# Patient Record
Sex: FEMALE | Race: WHITE | NOT HISPANIC OR LATINO | Employment: OTHER | URBAN - METROPOLITAN AREA
[De-identification: names, ages, dates, MRNs, and addresses within clinical notes are randomized per-mention and may not be internally consistent; named-entity substitution may affect disease eponyms.]

---

## 2018-06-14 ENCOUNTER — OFFICE VISIT (OUTPATIENT)
Dept: NEUROSURGERY | Facility: CLINIC | Age: 68
End: 2018-06-14

## 2018-06-14 VITALS — WEIGHT: 169 LBS | TEMPERATURE: 98 F | HEIGHT: 63 IN | BODY MASS INDEX: 29.95 KG/M2

## 2018-06-14 DIAGNOSIS — M48.062 SPINAL STENOSIS OF LUMBAR REGION WITH NEUROGENIC CLAUDICATION: Primary | ICD-10-CM

## 2018-06-14 DIAGNOSIS — M47.816 FACET ARTHROPATHY, LUMBAR: ICD-10-CM

## 2018-06-14 PROCEDURE — 99203 OFFICE O/P NEW LOW 30 MIN: CPT | Performed by: NEUROLOGICAL SURGERY

## 2018-06-14 RX ORDER — AMMONIUM LACTATE 12 G/100G
LOTION TOPICAL AS NEEDED
COMMUNITY
Start: 2018-05-25

## 2018-06-14 RX ORDER — SULFACETAMIDE SODIUM 100 MG/ML
LOTION TOPICAL
COMMUNITY
Start: 2018-05-25

## 2018-06-14 RX ORDER — CELECOXIB 200 MG/1
CAPSULE ORAL
COMMUNITY
Start: 2018-03-28

## 2018-06-14 RX ORDER — LEVOTHYROXINE SODIUM 75 MCG
TABLET ORAL
COMMUNITY
Start: 2018-03-28

## 2018-06-14 RX ORDER — KETOCONAZOLE 20 MG/ML
SHAMPOO TOPICAL
COMMUNITY
Start: 2018-06-01

## 2018-06-14 NOTE — PATIENT INSTRUCTIONS
Spinal Stenosis  Spinal stenosis occurs when the open space (spinal canal) between the bones of your spine (vertebrae) narrows, putting pressure on the spinal cord or nerves.  What are the causes?  This condition is caused by areas of bone pushing into the central canals of your vertebrae. This condition may be present at birth (congenital), or it may be caused by:  · Arthritic deterioration of your vertebrae (spinal degeneration). This usually starts around age 50.  · Injury or trauma to the spine.  · Tumors in the spine.  · Calcium deposits in the spine.  What are the signs or symptoms?  Symptoms of this condition include:  · Pain in the neck or back that is generally worse with activities, particularly when standing and walking.  · Numbness, tingling, hot or cold sensations, weakness, or weariness in your legs.  · Pain going up and down the leg (sciatica).  · Frequent episodes of falling.  · A foot-slapping gait that leads to muscle weakness.  In more serious cases, you may develop:  · Problems passing stool or passing urine.  · Difficulty having sex.  · Loss of feeling in part or all of your leg.  Symptoms may come on slowly and get worse over time.  How is this diagnosed?  This condition is diagnosed based on your medical history and a physical exam. Tests will also be done, such as:  · MRI.  · CT scan.  · X-ray.  How is this treated?  Treatment for this condition often focuses on managing your pain and any other symptoms. Treatment may include:  · Practicing good posture to lessen pressure on your nerves.  · Exercising to strengthen muscles, build endurance, improve balance, and maintain good joint movement (range of motion).  · Losing weight, if needed.  · Taking medicines to reduce swelling, inflammation, or pain.  · Assistive devices, such as a corset or brace.  In some cases, surgery may be needed. The most common procedure is decompression laminectomy. This is done to remove excess bone that puts pressure  on your nerve roots.  Follow these instructions at home:  Managing pain, stiffness, and swelling   · Do all exercises and stretches as told by your health care provider.  · Practice good posture. If you were given a brace or a corset, wear it as told by your health care provider.  · Do not do any activities that cause pain. Ask your health care provider what activities are safe for you.  · Do not lift anything that is heavier than 10 lb (4.5 kg) or the limit that your health care provider tells you.  · Maintain a healthy weight. Talk with your health care provider if you need help losing weight.  · If directed, apply heat to the affected area as often as told by your health care provider. Use the heat source that your health care provider recommends, such as a moist heat pack or a heating pad.  ¨ Place a towel between your skin and the heat source.  ¨ Leave the heat on for 20-30 minutes.  ¨ Remove the heat if your skin turns bright red. This is especially important if you are not able to feel pain, heat, or cold. You may have a greater risk of getting burned.  General instructions   · Take over-the-counter and prescription medicines only as told by your health care provider.  · Do not use any products that contain nicotine or tobacco, such as cigarettes and e-cigarettes. If you need help quitting, ask your health care provider.  · Eat a healthy diet. This includes plenty of fruits and vegetables, whole grains, and low-fat (lean) protein.  · Keep all follow-up visits as told by your health care provider. This is important.  Contact a health care provider if:  · Your symptoms do not get better or they get worse.  · You have a fever.  Get help right away if:  · You have new or worse pain in your neck or upper back.  · You have severe pain that cannot be controlled with medicines.  · You are dizzy.  · You have vision problems, blurred vision, or double vision.  · You have a severe headache that is worse when you  stand.  · You have nausea or you vomit.  · You develop new or worse numbness or tingling in your back or legs.  · You have pain, redness, swelling, or warmth in your arm or leg.  Summary  · Spinal stenosis occurs when the open space (spinal canal) between the bones of your spine (vertebrae) narrows. This narrowing puts pressure on the spinal cord or nerves.  · Spinal stenosis can cause numbness, weakness, or pain in the neck, back, and legs.  · This condition may be caused by a birth defect, arthritic deterioration of your vertebrae, injury, tumors, or calcium deposits.  · This condition is usually diagnosed with MRIs, CT scans, and X-rays.  This information is not intended to replace advice given to you by your health care provider. Make sure you discuss any questions you have with your health care provider.  Document Released: 03/09/2005 Document Revised: 11/22/2017 Document Reviewed: 11/22/2017  [employer] Interactive Patient Education © 2017 [employer]    Laminectomy  Laminectomy is a surgery to remove:  · Small pieces of bone in the spine (lamina).  · Tough, cord-like tissues that connect bones to other bones (ligaments) underneath the lamina. These ligaments connect the bones in the spine (vertebrae).  · Parts of joints in the spine (facet joints) that have grown too large.  The goals of this surgery are:  · To reduce pressure on nerves and the spinal cord.  · To reduce pain, numbness, and discomfort.  You may need this procedure if you have narrowing of the spinal canal (spinal stenosis) or if you have a spinal tumor, spinal injury, or Paget disease of bone.  Tell a health care provider about:  · Any allergies you have.  · All medicines you are taking, including vitamins, herbs, eye drops, creams, and over-the-counter medicines.  · Any problems you or family members have had with anesthetic medicines.  · Any blood disorders you have.  · Any surgeries you have had.  · Any medical conditions you have,  including a cold or any infections.  · Whether you are pregnant or may be pregnant.  What are the risks?  Generally, this is a safe procedure. However, problems may occur, including:  · Infection.  · Bleeding.  · Allergic reactions to medicines or dyes.  · Damage to other structures or organs, such as nerves. Nerve damage can cause pain, weakness, or numbness.  · Leaking of spinal fluid.  · A blood clot in a leg. The clot can move to the lungs, which can be very serious.  · Inability to control when you urinate (urinary incontinence) or when you have bowel movements (fecal incontinence). This is rare.  What happens before the procedure?  Staying hydrated   Follow instructions from your health care provider about hydration, which may include:  · Up to 2 hours before the procedure - you may continue to drink clear liquids, such as water, clear fruit juice, black coffee, and plain tea.  Eating and drinking restrictions   Follow instructions from your health care provider about eating and drinking, which may include:  · 24 hours before the procedure - stop drinking alcohol.  · 8 hours before the procedure - stop eating heavy meals or foods such as meat, fried foods, or fatty foods.  · 6 hours before the procedure - stop eating light meals or foods, such as toast or cereal.  · 6 hours before the procedure - stop drinking milk or drinks that contain milk.  · 2 hours before the procedure - stop drinking clear liquids.  Medicines   · Ask your health care provider about:  ¨ Changing or stopping your regular medicines. This is especially important if you are taking diabetes medicines or blood thinners. If your health care provider asks you to keep taking some medicines, take them with a sip of water.  ¨ Taking medicines such as aspirin and ibuprofen. These medicines can thin your blood. Do not take these medicines before your procedure if your health care provider instructs you not to.  · You may be given antibiotic medicine  to help prevent infection.  General instructions   · Do not use any products that contain nicotine or tobacco, such as cigarettes and e-cigarettes, for at least 2 weeks before the procedure. If you need help quitting, ask your health care provider.  · Ask your health care provider how your surgical site will be marked or identified.  · You may have tests done, such as blood tests or an electrocardiogram (ECG).  · If you will be going home right after the procedure, plan to have someone with you for 24 hours.  · Plan to have someone:  ¨ Take you home from the hospital or clinic.  ¨ Help you at home for the first week after the procedure.  What happens during the procedure?  · To reduce your risk of infection, your health care team will wash or sanitize their hands.  · Small monitors will be placed on your body to check your heart rate, blood pressure, and oxygen level.  · An IV tube will be inserted into one of your veins.  · You will be given a medicine to make you fall asleep (general anesthetic). You may also be given a medicine to help you relax (sedative).  · A breathing tube will be placed into your lungs.  · Your back will be cleaned with a germ-killing solution.  · An incision will be made in your back. The incision may be 2-5 inches (5-13 cm) long, depending on how many vertebrae are being operated on.  · Muscles in your back will be moved away from the vertebrae and pulled to the side.  · Pieces of lamina will be removed.  · Ligaments and thickened facet joints will be removed. How much tissue and bone is removed depends on how much of the tissue is putting pressure on your nerves.  · Your nerves will be identified and evaluated to check for excessive tightness.  · Your back muscles will be moved back into their normal position.  · The area under your skin will be closed with small, dissolvable stitches (sutures).  · Your skin will be closed with small, absorbable sutures or staples.  · A bandage (dressing)  will be placed over your incision.  The procedure may vary among health care providers and hospitals.  What happens after the procedure?  · Your blood pressure, heart rate, breathing rate, and blood oxygen level will be monitored until the medicines you were given have worn off.  · You may continue receive medicines and fluids through an IV tube.  · You will have some back pain. You will be given pain medicine as needed.  · It is important to be up and moving as soon as possible after this procedure. Physical therapists will help you start walking.  · To prevent blood clots in your legs:  ¨ You may have to wear compression stockings. These stockings also reduce swelling in your legs.  ¨ You may need to take medicines.  · You may need to do breathing exercises to help prevent lung infection.  · Do not drive for 24 hours if you received a sedative.  Summary  · Laminectomy is a procedure that is done to reduce pressure on nerves and the spinal cord and to reduce pain, numbness, and discomfort.  · If you will be going home right after the procedure, plan to have someone with you for 24 hours.  · You will have some back pain. You will be given pain medicine as needed.  · It is important to be up and moving as soon as possible after this procedure. Physical therapists will help you start walking.  This information is not intended to replace advice given to you by your health care provider. Make sure you discuss any questions you have with your health care provider.  Document Released: 12/06/2010 Document Revised: 08/03/2017 Document Reviewed: 06/04/2017  [employer] Interactive Patient Education © 2017 [employer]    Laminectomy, Care After  This sheet gives you information about how to care for yourself after your procedure. Your health care provider may also give you more specific instructions. If you have problems or questions, contact your health care provider.  What can I expect after the procedure?  After the  procedure, it is common to have:  · Some pain around your incision area.  · Muscle tightening (spasms) across the back.  Follow these instructions at home:  Incision care   · Follow instructions from your health care provider about how to take care of your incision area. Make sure you:  ¨ Wash your hands with soap and water before and after you apply medicine to the area or change your bandage (dressing). If soap and water are not available, use hand .  ¨ Change your dressing as told by your health care provider.  ¨ Leave stitches (sutures), skin glue, or adhesive strips in place. These skin closures may need to stay in place for 2 weeks or longer. If adhesive strip edges start to loosen and curl up, you may trim the loose edges. Do not remove adhesive strips completely unless your health care provider tells you to do that.  · Check your incision area every day for signs of infection. Check for:  ¨ More redness, swelling, or pain.  ¨ More fluid or blood.  ¨ Warmth.  ¨ Pus or a bad smell.  Medicines   · Take over-the-counter and prescription medicines only as told by your health care provider.  · If you were prescribed an antibiotic medicine, use it as told by your health care provider. Do not stop using the antibiotic even if you start to feel better.  Bathing   · Do not take baths, swim, or use a hot tub for 2 weeks, or until your incision has healed completely.  · If your health care provider approves, you may take showers after your dressing has been removed.  Activity   · Return to your normal activities as told by your health care provider. Ask your health care provider what activities are safe for you.  · Avoid bending or twisting at your waist. Always bend at your knees.  · Do not sit for more than 20-30 minutes at a time. Lie down or walk between periods of sitting.  · Do not lift anything that is heavier than 10 lb (4.5 kg) or the limit that your health care provider tells you, until he or she says  that it is safe.  · Do not drive for 2 weeks after your procedure or for as long as your health care provider tells you.  · Do not drive or use heavy machinery while taking prescription pain medicine.  General instructions   · To prevent or treat constipation while you are taking prescription pain medicine, your health care provider may recommend that you:  ¨ Drink enough fluid to keep your urine clear or pale yellow.  ¨ Take over-the-counter or prescription medicines.  ¨ Eat foods that are high in fiber, such as fresh fruits and vegetables, whole grains, and beans.  ¨ Limit foods that are high in fat and processed sugars, such as fried and sweet foods.  · Do breathing exercises as told.  · Keep all follow-up visits as told by your health care provider. This is important.  Contact a health care provider if:  · You have more redness, swelling, or pain around your incision area.  · Your incision feels warm to the touch.  · You are not able to return to activities or do exercises as told by your health care provider.  Get help right away if:  · You have:  ¨ More fluid or blood coming from your incision area.  ¨ Pus or a bad smell coming from your incision area.  ¨ Chills or a fever.  ¨ Episodes of dizziness or fainting while standing.  · You develop a rash.  · You develop shortness of breath or you have difficulty breathing.  · You cannot control when you urinate or have a bowel movement.  · You become weak.  · You are not able to use your legs.  Summary  · After the procedure, it is common to have some pain around your incision area. You may also have muscle tightening (spasms) across the back.  · Follow instructions from your health care provider about how to care for your incision.  · Do not lift anything that is heavier than 10 lb (4.5 kg) or the limit that your health care provider tells you, until he or she says that it is safe.  · Contact your health care provider if you have more redness, swelling, or pain  around your incision area or if your incision feels warm to the touch. These can be signs of infection.  This information is not intended to replace advice given to you by your health care provider. Make sure you discuss any questions you have with your health care provider.  Document Released: 07/07/2006 Document Revised: 08/03/2017 Document Reviewed: 06/04/2017  [employer] Interactive Patient Education © 2017 [employer]      If you have any questions in regards to your visit with  today, please do not hesitate to contact our office. Ask to speak with a CMA for any clinical questions you may have.    Ledy Yuan, [facility]    [phone]

## 2018-06-14 NOTE — PROGRESS NOTES
Subjective   Patient ID: Shelly Stevens is a 67 y.o. female is being seen for consultation today at the request of Josep Atkinson MD  Chief Complaint: Lower back and leg pain    History of Present Illness: The patient is a 67-year-old woman from [place] who has a complaint of pain in her lower back this been present for years and is slowly worsening as time goes on, particularly in the past year.  She has pain in her back at the lumbosacral region that radiates to her hips and into her lateral thighs which occurs with lifting, sitting too long, riding long distance in the car.  Sometimes she wakes up in pain.  When she is gardening she feels better she's bent forward.  Ice tends to help her more than heat.  She tried a brief course of physical therapy.  She has had chiropractic therapy off and on for years with satisfactory help.  She also experiences some neck pain.  She had a prior consultation with Dr. Sigala in [place] and is here for another opinion.    Review of Radiographic Studies:   Lumbar MRI scan shows moderate stenosis at L3-4, degenerative disc at L4-5.  No spondylolisthesis.    The following portions of the patient's history were reviewed, updated as appropriate and approved: allergies, current medications, past family history, past medical history, past social history, past surgical history, review of systems and problem list.    Review of Systems   Constitutional: Negative for activity change, appetite change, chills, diaphoresis, fatigue, fever and unexpected weight change.   HENT: Negative for congestion, dental problem, drooling, ear discharge, ear pain, facial swelling, hearing loss, mouth sores, nosebleeds, postnasal drip, rhinorrhea, sinus pressure, sneezing, sore throat, tinnitus, trouble swallowing and voice change.    Eyes: Negative for photophobia, pain, discharge, redness, itching and visual disturbance.   Respiratory: Negative for apnea, cough, choking, chest tightness,  shortness of breath, wheezing and stridor.    Cardiovascular: Negative for chest pain, palpitations and leg swelling.   Gastrointestinal: Negative for abdominal distention, abdominal pain, anal bleeding, blood in stool, constipation, diarrhea, nausea, rectal pain and vomiting.   Endocrine: Negative for cold intolerance, heat intolerance, polydipsia, polyphagia and polyuria.   Genitourinary: Negative for decreased urine volume, difficulty urinating, dysuria, enuresis, flank pain, frequency, genital sores, hematuria and urgency.   Musculoskeletal: Positive for myalgias, neck pain and neck stiffness. Negative for arthralgias, back pain, gait problem and joint swelling.   Skin: Negative for color change, pallor, rash and wound.   Allergic/Immunologic: Positive for environmental allergies. Negative for food allergies and immunocompromised state.   Neurological: Positive for weakness and light-headedness. Negative for dizziness, tremors, seizures, syncope, facial asymmetry, speech difficulty, numbness and headaches.   Hematological: Negative for adenopathy. Does not bruise/bleed easily.   Psychiatric/Behavioral: Positive for decreased concentration. Negative for agitation, behavioral problems, confusion, dysphoric mood, hallucinations, self-injury, sleep disturbance and suicidal ideas. The patient is nervous/anxious. The patient is not hyperactive.    All other systems reviewed and are negative.      Objective     NEUROLOGICAL EXAMINATION:      MENTAL STATUS:  Alert and oriented.  Speech intact.  Recent and remote memory intact.      CRANIAL NERVES:  Cranial nerve II:  Visual fields are full.  Cranial nerves III, IV and VI:  PERRLADC.  Extraocular movements are intact.  Nystagmus is not present.  Cranial nerve V:  Facial sensation is intact.  Cranial nerve VII:  Muscles of facial expression reveal no asymmetry.  Cranial nerve VIII:  Hearing is intact.  Cranial nerves IX and X:  Palate elevates symmetrically.  Cranial  nerve XI:  Shoulder shrug is intact.  Cranial nerve XII:  Tongue is midline without evidence of atrophy or fasciculation.    MUSCULOSKELETAL:  SLR negative. Tavo's test negative.    MOTOR:  Intact strength in knee extension, ankle dorsiflexion, and plantar flexion bilaterally.    SENSATION:  No sensory loss noted in either lower extremity.    REFLEXES:  DTR 2+ both knees and both ankles.      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  Degenerative facet and disc disease L3-4 and L4-5 with moderate back pain syndrome.       Plan   Surgery for the stenosis at this point doesn't seem to be necessary as I believe the symptoms can be controlled adequately with reasonable medication, exercise, and avoiding aggravating maneuvers with the back.  If symptoms of neurogenic claudication continued to progress however she would be a satisfactory candidate for a decompression of the stenosis at L3-4.       Josep Beltran MD

## 2018-06-15 ENCOUNTER — TELEPHONE (OUTPATIENT)
Dept: NEUROSURGERY | Facility: CLINIC | Age: 68
End: 2018-06-15

## 2018-06-15 DIAGNOSIS — M48.062 SPINAL STENOSIS OF LUMBAR REGION WITH NEUROGENIC CLAUDICATION: Primary | ICD-10-CM

## 2018-06-15 RX ORDER — CYCLOBENZAPRINE HCL 10 MG
10 TABLET ORAL NIGHTLY
Qty: 30 TABLET | Refills: 0 | Status: SHIPPED | OUTPATIENT
Start: 2018-06-15

## 2018-06-15 NOTE — TELEPHONE ENCOUNTER
Provider: Devin   Caller: Shelly  Time of call:   1115d  Phone #:  [phone]  Last visit:  Yesterday 6/15/18   Next visit: PRN        Reason for call:       Pt states she's been taking Tylenol and Advil for pain but it messes with her stomach. Pt wants to know if  will prescribe something other than a NSAID for pt to take mostly at night time since she wakes up through out the night with pain. ]    Please advise.

## 2019-09-12 ENCOUNTER — APPOINTMENT (OUTPATIENT)
Dept: WOMENS IMAGING | Facility: HOSPITAL | Age: 69
End: 2019-09-12

## 2019-09-12 PROCEDURE — 77063 BREAST TOMOSYNTHESIS BI: CPT | Performed by: RADIOLOGY

## 2019-09-12 PROCEDURE — 77067 SCR MAMMO BI INCL CAD: CPT | Performed by: RADIOLOGY

## 2020-08-19 ENCOUNTER — APPOINTMENT (OUTPATIENT)
Dept: WOMENS IMAGING | Facility: HOSPITAL | Age: 70
End: 2020-08-19

## 2020-08-19 PROCEDURE — 77062 BREAST TOMOSYNTHESIS BI: CPT | Performed by: RADIOLOGY

## 2020-08-19 PROCEDURE — 76641 ULTRASOUND BREAST COMPLETE: CPT | Performed by: RADIOLOGY

## 2020-08-19 PROCEDURE — 77066 DX MAMMO INCL CAD BI: CPT | Performed by: RADIOLOGY

## 2021-02-23 ENCOUNTER — CONSULT (OUTPATIENT)
Dept: CARDIOLOGY | Facility: CLINIC | Age: 71
End: 2021-02-23

## 2021-02-23 VITALS
HEIGHT: 63 IN | WEIGHT: 172 LBS | HEART RATE: 73 BPM | OXYGEN SATURATION: 94 % | SYSTOLIC BLOOD PRESSURE: 120 MMHG | BODY MASS INDEX: 30.48 KG/M2 | DIASTOLIC BLOOD PRESSURE: 74 MMHG

## 2021-02-23 DIAGNOSIS — R55 SYNCOPE AND COLLAPSE: Primary | ICD-10-CM

## 2021-02-23 PROCEDURE — 99204 OFFICE O/P NEW MOD 45 MIN: CPT | Performed by: PHYSICIAN ASSISTANT

## 2021-02-23 PROCEDURE — 93000 ELECTROCARDIOGRAM COMPLETE: CPT | Performed by: PHYSICIAN ASSISTANT

## 2021-02-23 RX ORDER — OLOPATADINE HYDROCHLORIDE 1 MG/ML
1 SOLUTION/ DROPS OPHTHALMIC 2 TIMES DAILY PRN
COMMUNITY

## 2021-02-23 RX ORDER — PANTOPRAZOLE SODIUM 40 MG/1
TABLET, DELAYED RELEASE ORAL AS NEEDED
COMMUNITY

## 2021-02-23 RX ORDER — CHOLECALCIFEROL (VITAMIN D3) 125 MCG
5 CAPSULE ORAL
COMMUNITY

## 2021-02-23 RX ORDER — CLONAZEPAM 0.5 MG/1
0.5 TABLET ORAL NIGHTLY PRN
COMMUNITY
Start: 2021-02-08

## 2021-02-23 RX ORDER — MULTIPLE VITAMINS W/ MINERALS TAB 9MG-400MCG
1 TAB ORAL DAILY
COMMUNITY

## 2021-02-23 RX ORDER — CHLORAL HYDRATE 500 MG
CAPSULE ORAL
COMMUNITY

## 2021-02-23 RX ORDER — MONTELUKAST SODIUM 10 MG/1
10 TABLET ORAL NIGHTLY
COMMUNITY

## 2021-02-23 RX ORDER — ALBUTEROL SULFATE 90 UG/1
POWDER, METERED RESPIRATORY (INHALATION)
COMMUNITY

## 2021-02-23 RX ORDER — LEVOCETIRIZINE DIHYDROCHLORIDE 5 MG/1
5 TABLET, FILM COATED ORAL AS NEEDED
COMMUNITY
Start: 2021-01-01

## 2021-02-23 NOTE — PROGRESS NOTES
"[facility] at [facility]  INITIAL OFFICE CONSULT      Shelly Stevens  1950  PCP: Josep Atkinson MD    SUBJECTIVE:   Shelly Stevens is a 70 y.o. female seen for a consultation visit regarding the following:     Chief Complaint:   Chief Complaint   Patient presents with   • Syncope     consult          Consultation is requested by Josep Atkinson MD for evaluation of Syncope (consult)        History:  Pleasant 70-year-old female presents today for consultation requested primary care provider regarding palpitations and near syncope events.  The patient had a work-up with local cardiologist 2 years ago for the same symptoms including a negative stress test normal echocardiogram and -24-hour monitor and carotid scan.  The patient ports continues to have issues her main concern is that she has some abdominal \"spasm\" which may be due to her bladder spasms.  This sometimes can trigger episodes where she feels nausea weakness diaphoretic she feels that some lightheadedness and her heart rate increases feels like she may pass out.  She has not had complete syncope event.  She has not had any chest pain or chest tightness with exertion suggesting angina.  She denies any heart failure symptoms.  She denies any willie syncope events.  Events always occur in order where she feels symptoms of abdominal discomfort initially nausea and then vasovagal-like symptoms.  She does have a severe amount of anxiety and is unable to take medication as is intolerant to Lexapro in the past.      Cardiac PMH: (Old records have been reviewed and summarized below)  1. Near syncope  a. Normal MPS 2019 Can  b. Echocardiogram EF Normal, diastolic dysfunction. Mild AI, MR. 2019  c. Negative 24 hour monitor Dr. Can  d. Negative Carotid  scan 2019  2. Labile BP, Marginal SBP  3. Depression, Anxiety  4. Hypothyroidism  5. GERD  6. Asthma    Past Medical History, Past Surgical History, Family history, Social " History, and Medications were all reviewed with the patient today and updated as necessary.     Current Outpatient Medications   Medication Sig Dispense Refill   • albuterol (ProAir RespiClick) 108 (90 Base) MCG/ACT inhaler ProAir RespiClick 90 mcg/actuation breath activated   Inhale 1 puff every 4-6 hours by inhalation route as needed for 30 days.     • ammonium lactate (LAC-HYDRIN) 12 % lotion      • Asmanex, 30 Metered Doses, 220 MCG/INH inhaler Take 220 mcg by mouth As Needed.     • celecoxib (CeleBREX) 200 MG capsule      • clonazePAM (KlonoPIN) 0.5 MG tablet Take 0.5 mg by mouth Daily.     • cyclobenzaprine (FLEXERIL) 10 MG tablet Take 1 tablet by mouth Every Night. 30 tablet 0   • ketoconazole (NIZORAL) 2 % shampoo      • levocetirizine (XYZAL) 5 MG tablet Take 5 mg by mouth As Needed.     • melatonin 5 MG tablet tablet Take 5 mg by mouth.     • montelukast (SINGULAIR) 10 MG tablet Take 10 mg by mouth Every Night.     • multivitamin with minerals tablet tablet Take 1 tablet by mouth Daily.     • olopatadine (PATANOL) 0.1 % ophthalmic solution 1 drop 2 (Two) Times a Day As Needed for Allergies.     • Omega-3 Fatty Acids (fish oil) 1000 MG capsule capsule Take  by mouth Daily With Breakfast.     • pantoprazole (Protonix) 40 MG EC tablet Protonix 40 mg tablet,delayed release   Daily     • Probiotic Product (PROBIOTIC-10 PO) Take  by mouth.     • Sulfacetamide Sodium, Acne, 10 % lotion      • SYNTHROID 75 MCG tablet      • TURMERIC CURCUMIN PO Take  by mouth.       No current facility-administered medications for this visit.      Allergies   Allergen Reactions   • Gentamicin Palpitations         History reviewed. No pertinent past medical history.  Past Surgical History:   Procedure Laterality Date   • GALLBLADDER SURGERY     • HYSTERECTOMY     • SPHENOIDECTOMY     • TONSILLECTOMY       History reviewed. No pertinent family history.  Social History     Tobacco Use   • Smoking status: Never Smoker   • Smokeless  "tobacco: Never Used   Substance Use Topics   • Alcohol use: Never     Frequency: Never       ROS:  Review of Symptoms:  General: no recent weight loss/gain, weakness or fatigue  Skin: no rashes, lumps, or other skin changes  HEENT: no dizziness, lightheadedness, or vision changes  Respiratory: no cough or hemoptysis  Cardiovascular: + palpitations, and tachycardia  Gastrointestinal: no black/tarry stools or diarrhea  Urinary: no change in frequency or urgency  Peripheral Vascular: no claudication or leg cramps  Musculoskeletal: no muscle or joint pain/stiffness  Psychiatric: no depression or excessive stress  Neurological: no sensory or motor loss, no syncope  Hematologic: no anemia, easy bruising or bleeding  Endocrine: no thyroid problems, nor heat or cold intolerance         PHYSICAL EXAM:   /74 (BP Location: Left arm, Patient Position: Sitting)   Pulse 73   Ht 160 cm (63\")   Wt 78 kg (172 lb)   SpO2 94%   BMI 30.47 kg/m²      Wt Readings from Last 5 Encounters:   02/23/21 78 kg (172 lb)   06/14/18 76.7 kg (169 lb)     BP Readings from Last 5 Encounters:   02/23/21 120/74       General-Well Nourished, Well developed  Eyes - PERRLA  Neck- supple, No mass  CV- regular rate and rhythm, no MRG  Lung- clear bilaterally  Abd- soft, +BS  Musc/skel - Norm strength and range of motion  Skin- warm and dry  Neuro - Alert & Oriented x 3, appropriate mood.    Patient's external notes were reviewed.  Independent interpretation of test performed by another physician in facility were reviewed.  Outside laboratory data was also reviewed.    Medical problems and test results were reviewed with the patient today.     No results found for this or any previous visit.      No results found for: CHOL, HDL, HDLC, LDL, LDLC, VLDL    EKG:  (EKG/Tracing has been independently visualized by me and summarized below)      ECG 12 Lead    Date/Time: 2/23/2021 2:58 PM  Performed by: Tian Turner PA  Authorized by: Yue, " YUE Kwan   Rhythm: sinus rhythm  Rate: normal  Conduction: conduction normal  ST Segments: ST segments normal  T Waves: T waves normal  QRS axis: normal  Other: no other findings    Clinical impression: normal ECG            ASSESSMENT   1. Vasovagal syncope   2. Normal MPS with No ischemia 2019  3. VHD: Echocardiogram 2019, Mild AI, MR Normal EF.   4. Tremors, abdominal pain and spasim  5. Severe Anxiety: Klonopin.       PLAN  · Zio monitor rule out any significant arrhythmias.  We have asked patient continue monitor blood pressure closely.  · Increase hydration, fluids Gatorade  · Severe anxiety-recommend initiating SSRI consider Celexa this may improve her symptoms.  · Reviewed previous cardiac evaluation with 2019 that revealed normal stress test and echocardiogram revealing mild valvular disease.  · Return to follow-up patient in 1 month or sooner as needed.            Cardiology/Electrophysiology  02/23/21  14:56 ROSE Turner PA-C

## 2021-03-23 ENCOUNTER — OFFICE VISIT (OUTPATIENT)
Dept: CARDIOLOGY | Facility: CLINIC | Age: 71
End: 2021-03-23

## 2021-03-23 VITALS
DIASTOLIC BLOOD PRESSURE: 64 MMHG | WEIGHT: 172 LBS | BODY MASS INDEX: 30.48 KG/M2 | OXYGEN SATURATION: 98 % | HEIGHT: 63 IN | SYSTOLIC BLOOD PRESSURE: 110 MMHG | HEART RATE: 60 BPM

## 2021-03-23 DIAGNOSIS — R00.2 PALPITATIONS: Primary | ICD-10-CM

## 2021-03-23 PROBLEM — R55 VASOVAGAL SYNCOPE: Status: ACTIVE | Noted: 2021-03-23

## 2021-03-23 PROCEDURE — 99213 OFFICE O/P EST LOW 20 MIN: CPT | Performed by: PHYSICIAN ASSISTANT

## 2021-03-23 NOTE — PROGRESS NOTES
[facility] at [facility]   OFFICE NOTE      Shelly Stevens  1950  PCP: Josep Atkinson MD    SUBJECTIVE:   Shelly Stevens is a 70 y.o. female seen for a follow up visit regarding the following:     CC:Vasovagal syncope    HPI:   Pleasant 70-year-old female presents today for follow-up regarding near syncope events, marginal blood pressure and recent ZIO monitor results.  The patient reports that since last visit she has tried to increase her hydration that she does feel that this is helped reduce the amount events that she experiencing.  She does occasionally have events of wearing the Holter monitor.  The monitor revealed no significant arrhythmias associated with her near syncope events.  She states hydration is helping these episodes.  She notes that they be more often with no sleep or little sleep she has more events.  She denies any chest pain or chest tightness suggesting angina pectoris.  She has no heart failure symptoms.  She recently visited ENT specialist regarding a sore throat and she may require an EGD.    Cardiac PMH: (Old records have been reviewed and summarized below)  1. Near syncope-symptoms suggest Vasovagal syncope  a. Normal MPS 2019 Juan José  b. Echocardiogram EF Normal, diastolic dysfunction. Mild AI, MR. 2019  c. Negative 24 hour monitor Dr. Can  d. Negative Carotid  scan 2019 Dr. Can  2. Labile BP, Marginal SBP  3. Depression, Anxiety  4. Hypothyroidism  5. GERD  6. Asthma    Past Medical History, Past Surgical History, Family history, Social History, and Medications were all reviewed with the patient today and updated as necessary.       Current Outpatient Medications:   •  albuterol (ProAir RespiClick) 108 (90 Base) MCG/ACT inhaler, ProAir RespiClick 90 mcg/actuation breath activated  Inhale 1 puff every 4-6 hours by inhalation route as needed for 30 days., Disp: , Rfl:   •  ammonium lactate (LAC-HYDRIN) 12 % lotion, , Disp: , Rfl:   •  Asmanex, 30  Metered Doses, 220 MCG/INH inhaler, Take 220 mcg by mouth As Needed., Disp: , Rfl:   •  celecoxib (CeleBREX) 200 MG capsule, , Disp: , Rfl:   •  clonazePAM (KlonoPIN) 0.5 MG tablet, Take 0.5 mg by mouth Daily., Disp: , Rfl:   •  cyclobenzaprine (FLEXERIL) 10 MG tablet, Take 1 tablet by mouth Every Night., Disp: 30 tablet, Rfl: 0  •  ketoconazole (NIZORAL) 2 % shampoo, , Disp: , Rfl:   •  levocetirizine (XYZAL) 5 MG tablet, Take 5 mg by mouth As Needed., Disp: , Rfl:   •  melatonin 5 MG tablet tablet, Take 5 mg by mouth., Disp: , Rfl:   •  montelukast (SINGULAIR) 10 MG tablet, Take 10 mg by mouth Every Night., Disp: , Rfl:   •  multivitamin with minerals tablet tablet, Take 1 tablet by mouth Daily., Disp: , Rfl:   •  olopatadine (PATANOL) 0.1 % ophthalmic solution, 1 drop 2 (Two) Times a Day As Needed for Allergies., Disp: , Rfl:   •  Omega-3 Fatty Acids (fish oil) 1000 MG capsule capsule, Take  by mouth Daily With Breakfast., Disp: , Rfl:   •  pantoprazole (Protonix) 40 MG EC tablet, Protonix 40 mg tablet,delayed release  Daily, Disp: , Rfl:   •  Probiotic Product (PROBIOTIC-10 PO), Take  by mouth., Disp: , Rfl:   •  Sulfacetamide Sodium, Acne, 10 % lotion, , Disp: , Rfl:   •  SYNTHROID 75 MCG tablet, , Disp: , Rfl:   •  TURMERIC CURCUMIN PO, Take  by mouth., Disp: , Rfl:       Allergies   Allergen Reactions   • Gentamicin Palpitations     Patient Active Problem List   Diagnosis   • Spinal stenosis of lumbar region with neurogenic claudication L3-4      History reviewed. No pertinent past medical history.  Past Surgical History:   Procedure Laterality Date   • GALLBLADDER SURGERY     • HYSTERECTOMY     • SPHENOIDECTOMY     • TONSILLECTOMY       History reviewed. No pertinent family history.  Social History     Tobacco Use   • Smoking status: Never Smoker   • Smokeless tobacco: Never Used   Substance Use Topics   • Alcohol use: Never       ROS:  Review of Symptoms:  General: no recent weight loss/gain, weakness or  "fatigue  Skin: no rashes, lumps, or other skin changes  HEENT: no dizziness, lightheadedness, or vision changes  Respiratory: no cough or hemoptysis  Cardiovascular: no palpitations, and tachycardia  Gastrointestinal: no black/tarry stools or diarrhea  Urinary: no change in frequency or urgency  Peripheral Vascular: no claudication or leg cramps  Musculoskeletal: no muscle or joint pain/stiffness  Psychiatric: no depression or excessive stress  Neurological: no sensory or motor loss, no syncope  Hematologic: no anemia, easy bruising or bleeding  Endocrine: no thyroid problems, nor heat or cold intolerance    PHYSICAL EXAM:    /64 (BP Location: Right arm, Patient Position: Sitting)   Pulse 60   Ht 160 cm (63\")   Wt 78 kg (172 lb)   SpO2 98%   BMI 30.47 kg/m²        Wt Readings from Last 5 Encounters:   03/23/21 78 kg (172 lb)   02/23/21 78 kg (172 lb)   06/14/18 76.7 kg (169 lb)       BP Readings from Last 5 Encounters:   03/23/21 110/64   02/23/21 120/74       General appearance - Alert, well appearing, and in no distress   Mental status - Affect appropriate to mood.  Eyes - Sclerae anicteric,  ENMT - Hearing grossly normal bilaterally, Dental hygiene good.  Neck - Carotids upstroke normal bilaterally, no bruits, no JVD.  Resp - Clear to auscultation, no wheezes, rales or rhonchi, symmetric air entry.  Heart - Normal rate, regular rhythm, normal S1, S2, no murmurs, rubs, clicks or gallops.  GI - Soft, nontender, nondistended, no masses or organomegaly.  Neurological - Grossly intact - normal speech, no focal findings  Musculoskeletal - No joint tenderness, deformity or swelling, no muscular tenderness noted.  Extremities - Peripheral pulses normal, no pedal edema, no clubbing or cyanosis.  Skin - Normal coloration and turgor.  Psych -  oriented to person, place, and time.    Medical problems and test results were reviewed with the patient today.     No results found for this or any previous visit (from " the past 672 hour(s)).        ASSESSMENT   1. Vasovagal syncope-No recurrent events, Increase activities.  ZIO monitor that was worn for 2 weeks February 23, 2021 until March 8, 2021 revealed no evidence of significant arrhythmias no episodes of arrhythmias correlating with her near syncope events.  Symptoms consistently remained to be most likely vasovagal in nature.  Discussed option of continued hydration, consider Florinef.  We did discuss option of considering tilt table study but for now she like to defer this at this time and try conservative measures.  2. Normal MPS no Ischemia 2019 with Dr. Can  3. VHD: Echocardiogram 2019, Mild AI, Mild MR Normal EF.  With Dr. Can  4. Severe anxiety: Klonopin.  Discussed with the patient she would benefit from SSRI therapy she will ask her PCP.    PLAN  · ZIO monitor reveals no significant arrhythmias associated with near syncope events.  Discussed patient needs to focus on hydration, her symptoms do correlate with vasovagal events.  She would like to defer tilt table study at this time.  Discussed that she would probably benefit from SSRI to further improve her anxiety this may also help with her vasovagal events.  · Follow-up PCP, routine laboratory data, return to follow-up her office in 6 months or sooner as needed.      3/23/2021  11:57 EDT    Will Yue SORIANO

## 2021-05-19 ENCOUNTER — TELEPHONE (OUTPATIENT)
Dept: CARDIOLOGY | Facility: CLINIC | Age: 71
End: 2021-05-19

## 2021-05-19 NOTE — TELEPHONE ENCOUNTER
I returned the patient phone call and left a message. She wanted her visit mailed to her home address. I left the number for medical records and also offered for her to call back and that we could discuss the results.

## 2021-08-01 ENCOUNTER — HOSPITAL ENCOUNTER (EMERGENCY)
Dept: HOSPITAL 79 - ER1 | Age: 71
Discharge: HOME | End: 2021-08-01
Payer: MEDICARE

## 2021-08-01 DIAGNOSIS — Z90.710: ICD-10-CM

## 2021-08-01 DIAGNOSIS — U07.1: Primary | ICD-10-CM

## 2021-08-01 DIAGNOSIS — J45.901: ICD-10-CM

## 2021-08-01 LAB
BUN/CREATININE RATIO: 14 (ref 0–10)
HGB BLD-MCNC: 14.6 GM/DL (ref 12.3–15.3)
RED BLOOD COUNT: 4.68 M/UL (ref 4–5.1)
WHITE BLOOD COUNT: 4.9 K/UL (ref 4.5–11)

## 2021-11-16 ENCOUNTER — APPOINTMENT (OUTPATIENT)
Dept: WOMENS IMAGING | Facility: HOSPITAL | Age: 71
End: 2021-11-16

## 2021-11-16 PROCEDURE — 77067 SCR MAMMO BI INCL CAD: CPT | Performed by: RADIOLOGY

## 2021-11-16 PROCEDURE — 77063 BREAST TOMOSYNTHESIS BI: CPT | Performed by: RADIOLOGY

## 2022-05-31 ENCOUNTER — OFFICE VISIT (OUTPATIENT)
Dept: CARDIOLOGY | Facility: CLINIC | Age: 72
End: 2022-05-31

## 2022-05-31 VITALS
WEIGHT: 173 LBS | SYSTOLIC BLOOD PRESSURE: 138 MMHG | HEART RATE: 52 BPM | OXYGEN SATURATION: 97 % | HEIGHT: 63 IN | DIASTOLIC BLOOD PRESSURE: 66 MMHG | BODY MASS INDEX: 30.65 KG/M2

## 2022-05-31 DIAGNOSIS — R07.89 CHEST PAIN, ATYPICAL: ICD-10-CM

## 2022-05-31 DIAGNOSIS — R55 VASOVAGAL SYNCOPE: ICD-10-CM

## 2022-05-31 DIAGNOSIS — R06.02 SOB (SHORTNESS OF BREATH): Primary | ICD-10-CM

## 2022-05-31 PROCEDURE — 93000 ELECTROCARDIOGRAM COMPLETE: CPT | Performed by: PHYSICIAN ASSISTANT

## 2022-05-31 PROCEDURE — 99214 OFFICE O/P EST MOD 30 MIN: CPT | Performed by: PHYSICIAN ASSISTANT

## 2022-05-31 RX ORDER — DIAZEPAM 5 MG/1
5 TABLET ORAL AS NEEDED
COMMUNITY
Start: 2022-03-15

## 2022-05-31 NOTE — PROGRESS NOTES
[facility] at [facility]   OFFICE NOTE      Shelly Stevens  1950  PCP: Gillian Espana DO    SUBJECTIVE:   Shelly Stevens is a 71 y.o. female seen for a follow up visit regarding the following:     CC: Chest pain    HPI:   Pleasant 71-year-old female returns today follow-up regarding previous vasovagal syncope labile blood pressure and now recent chest pain.  Patient states she has had chest pain off and on for the past few months.  These are random in nature.  Not particular exertion.  She saw her pulmonary physician who put her through some testing which sounds like a pulmonary function test.  This was reported to be okay but she states that her pulmonologist recommended further cardiac work-up.  Patient states since COVID in February she has had some increasing shortness of breath.  She has not had any dizziness near syncope or syncope.  She is trying to hydrate further.    Cardiac PMH: (Old records have been reviewed and summarized below)  1. Near syncope-symptoms suggest Vasovagal syncope  a. Normal MPS 2019 Juan José  b. Echocardiogram EF Normal, diastolic dysfunction. Mild AI, MR. 2019  c. Negative 24 hour monitor Dr. Can  d. Negative Carotid  scan 2019 Dr. Can  2. Labile BP, Marginal SBP  3. Depression, Anxiety  4. Hypothyroidism  5. GERD  6. Asthma      Past Medical History, Past Surgical History, Family history, Social History, and Medications were all reviewed with the patient today and updated as necessary.       Current Outpatient Medications:   •  albuterol (ProAir RespiClick) 108 (90 Base) MCG/ACT inhaler, ProAir RespiClick 90 mcg/actuation breath activated  Inhale 1 puff every 4-6 hours by inhalation route as needed for 30 days., Disp: , Rfl:   •  ammonium lactate (LAC-HYDRIN) 12 % lotion, Apply  topically to the appropriate area as directed As Needed., Disp: , Rfl:   •  Asmanex, 30 Metered Doses, 220 MCG/INH inhaler, Take 220 mcg by mouth As Needed., Disp: , Rfl:  "  •  clonazePAM (KlonoPIN) 0.5 MG tablet, Take 0.5 mg by mouth At Night As Needed., Disp: , Rfl:   •  cyclobenzaprine (FLEXERIL) 10 MG tablet, Take 1 tablet by mouth Every Night. (Patient taking differently: Take 10 mg by mouth 2 (Two) Times a Day As Needed.), Disp: 30 tablet, Rfl: 0  •  diazePAM (VALIUM) 5 MG tablet, Take 5 mg by mouth As Needed., Disp: , Rfl:   •  levocetirizine (XYZAL) 5 MG tablet, Take 5 mg by mouth As Needed., Disp: , Rfl:   •  melatonin 5 MG tablet tablet, Take 5 mg by mouth., Disp: , Rfl:   •  montelukast (SINGULAIR) 10 MG tablet, Take 10 mg by mouth Every Night., Disp: , Rfl:   •  multivitamin with minerals tablet tablet, Take 1 tablet by mouth Daily., Disp: , Rfl:   •  olopatadine (PATANOL) 0.1 % ophthalmic solution, 1 drop 2 (Two) Times a Day As Needed for Allergies., Disp: , Rfl:   •  Omega-3 Fatty Acids (fish oil) 1000 MG capsule capsule, Take  by mouth Daily With Breakfast., Disp: , Rfl:   •  pantoprazole (PROTONIX) 40 MG EC tablet, As Needed., Disp: , Rfl:   •  Probiotic Product (PROBIOTIC-10 PO), Take  by mouth 2 (Two) Times a Week., Disp: , Rfl:   •  SYNTHROID 75 MCG tablet, , Disp: , Rfl:   •  TURMERIC CURCUMIN PO, Take  by mouth 1 (One) Time Per Week., Disp: , Rfl:   •  celecoxib (CeleBREX) 200 MG capsule, , Disp: , Rfl:   •  ketoconazole (NIZORAL) 2 % shampoo, , Disp: , Rfl:   •  Sulfacetamide Sodium, Acne, 10 % lotion, , Disp: , Rfl:       Allergies   Allergen Reactions   • Gentamicin Palpitations         PHYSICAL EXAM:    /66 (BP Location: Right arm, Patient Position: Sitting, Cuff Size: Adult)   Pulse 52   Ht 160 cm (63\")   Wt 78.5 kg (173 lb)   SpO2 97%   BMI 30.65 kg/m²        Wt Readings from Last 5 Encounters:   05/31/22 78.5 kg (173 lb)   03/23/21 78 kg (172 lb)   02/23/21 78 kg (172 lb)   06/14/18 76.7 kg (169 lb)       BP Readings from Last 5 Encounters:   05/31/22 138/66   03/23/21 110/64   02/23/21 120/74       General appearance - Alert, well appearing, and " "in no distress   Mental status - Affect appropriate to mood.  Eyes - Sclerae anicteric,  ENMT - Hearing grossly normal bilaterally, Dental hygiene good.  Neck - Carotids upstroke normal bilaterally, no bruits, no JVD.  Resp - Clear to auscultation, no wheezes, rales or rhonchi, symmetric air entry.  Heart - Normal rate, regular rhythm, normal S1, S2, no murmurs, rubs, clicks or gallops.  GI - Soft, nontender, nondistended, no masses or organomegaly.  Neurological - Grossly intact - normal speech, no focal findings  Musculoskeletal - No joint tenderness, deformity or swelling, no muscular tenderness noted.  Extremities - Peripheral pulses normal, no pedal edema, no clubbing or cyanosis.  Skin - Normal coloration and turgor.  Psych -  oriented to person, place, and time.    Medical problems and test results were reviewed with the patient today.     No results found for this or any previous visit (from the past 672 hour(s)).      EKG: (EKG has been independently visualized by me and summarized below)    ECG 12 Lead    Date/Time: 5/31/2022 4:50 PM  Performed by: Tian Turner PA  Authorized by: Tian Turner PA   Rhythm: sinus rhythm and sinus bradycardia  Rate: normal  Conduction: conduction normal  ST Segments: ST segments normal  T Waves: T waves normal  QRS axis: normal  Other: no other findings    Clinical impression: normal ECG              ASSESSMENT   1. SOB/ Chest pain. -Symptoms are typical for angina we will recommend a stress test rule ischemic heart disease with echocardiogram.  2. Asthma followed by  \"[facility]de\" in [place]. Currently on inhalers  3. COVID 7/2021, No hospitalization. Patient has not had vaccines.  5. AT-Zio monitor Brief AT.   6. 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   7. Essential Tremors, followed by Nerology.   8. HLD, Intolerance to statins. She jennifer like to try Livlo.       PLAN  · GXT cardiac stress test  · Dyslipidemia with history of statin intolerance.  She would " like to try Livalo.  · Echocardiogram return follow-up her office in 6 months or sooner if any change in symptoms.  · Return to follow-up in 6 months or sooner as needed            5/31/2022  13:10 EDT  Will Yue SORIANO

## 2022-06-02 ENCOUNTER — TELEPHONE (OUTPATIENT)
Dept: CARDIOLOGY | Facility: CLINIC | Age: 72
End: 2022-06-02

## 2022-06-02 NOTE — TELEPHONE ENCOUNTER
I submitted a PA for Livalo 2 mg QHS through [employer].        Shelly Stevens (Key: [id number])    [facility]

## 2022-06-15 ENCOUNTER — HOSPITAL ENCOUNTER (OUTPATIENT)
Dept: CARDIOLOGY | Facility: HOSPITAL | Age: 72
Discharge: HOME | End: 2022-06-15
Admitting: PHYSICIAN ASSISTANT

## 2022-06-15 VITALS — HEIGHT: 63 IN | BODY MASS INDEX: 30.66 KG/M2 | WEIGHT: 173.06 LBS

## 2022-06-15 DIAGNOSIS — R06.02 SOB (SHORTNESS OF BREATH): ICD-10-CM

## 2022-06-15 LAB
AORTIC DIMENSIONLESS INDEX: 0.69 (DI)
BH CV ECHO MEAS - ACS: 1.94 CM
BH CV ECHO MEAS - AI P1/2T: 648.6 MSEC
BH CV ECHO MEAS - AO MAX PG: 5.3 MMHG
BH CV ECHO MEAS - AO MEAN PG: 2.7 MMHG
BH CV ECHO MEAS - AO ROOT DIAM: 3.1 CM
BH CV ECHO MEAS - AO V2 MAX: 115 CM/SEC
BH CV ECHO MEAS - AO V2 VTI: 26.8 CM
BH CV ECHO MEAS - EDV(CUBED): 90.4 ML
BH CV ECHO MEAS - EF_3D-VOL: 60 %
BH CV ECHO MEAS - ESV(CUBED): 40 ML
BH CV ECHO MEAS - FS: 23.8 %
BH CV ECHO MEAS - IVS/LVPW: 1.12 CM
BH CV ECHO MEAS - IVSD: 1.06 CM
BH CV ECHO MEAS - LA 3D VOL INDEX: 30
BH CV ECHO MEAS - LA DIMENSION: 3.5 CM
BH CV ECHO MEAS - LAT PEAK E' VEL: 8.1 CM/SEC
BH CV ECHO MEAS - LV MASS(C)D: 152.7 GRAMS
BH CV ECHO MEAS - LV MAX PG: 2.6 MMHG
BH CV ECHO MEAS - LV MEAN PG: 1.17 MMHG
BH CV ECHO MEAS - LV V1 MAX: 79.9 CM/SEC
BH CV ECHO MEAS - LV V1 VTI: 19.9 CM
BH CV ECHO MEAS - LVIDD: 4.5 CM
BH CV ECHO MEAS - LVIDS: 3.4 CM
BH CV ECHO MEAS - LVPWD: 0.94 CM
BH CV ECHO MEAS - MED PEAK E' VEL: 5.8 CM/SEC
BH CV ECHO MEAS - MV A MAX VEL: 58.9 CM/SEC
BH CV ECHO MEAS - MV DEC SLOPE: 521.2 CM/SEC2
BH CV ECHO MEAS - MV DEC TIME: 0.26 MSEC
BH CV ECHO MEAS - MV E MAX VEL: 68 CM/SEC
BH CV ECHO MEAS - MV E/A: 1.16
BH CV ECHO MEAS - MV MAX PG: 3.4 MMHG
BH CV ECHO MEAS - MV MEAN PG: 1.08 MMHG
BH CV ECHO MEAS - MV P1/2T: 53.7 MSEC
BH CV ECHO MEAS - MV V2 VTI: 31.2 CM
BH CV ECHO MEAS - MVA(P1/2T): 4.1 CM2
BH CV ECHO MEAS - PA V2 MAX: 82.1 CM/SEC
BH CV ECHO MEAS - RAP SYSTOLE: 10 MMHG
BH CV ECHO MEAS - RV MAX PG: 1.13 MMHG
BH CV ECHO MEAS - RV V1 MAX: 53.1 CM/SEC
BH CV ECHO MEAS - RV V1 VTI: 13.5 CM
BH CV ECHO MEAS - RVDD: 2.6 CM
BH CV ECHO MEAS - RVSP: 26.5 MMHG
BH CV ECHO MEAS - TAPSE (>1.6): 2.31 CM
BH CV ECHO MEAS - TR MAX PG: 16.5 MMHG
BH CV ECHO MEAS - TR MAX VEL: 202.9 CM/SEC
BH CV ECHO MEASUREMENTS AVERAGE E/E' RATIO: 9.78
BH CV XLRA - TDI S': 11 CM/SEC
IVRT: 114 MSEC
MAXIMAL PREDICTED HEART RATE: 150 BPM
SINUS: 3.1 CM
STJ: 2.6 CM
STRESS TARGET HR: 128 BPM

## 2022-06-15 PROCEDURE — 93306 TTE W/DOPPLER COMPLETE: CPT

## 2022-06-15 PROCEDURE — 93306 TTE W/DOPPLER COMPLETE: CPT | Performed by: INTERNAL MEDICINE

## 2022-06-24 ENCOUNTER — HOSPITAL ENCOUNTER (OUTPATIENT)
Dept: CARDIOLOGY | Facility: HOSPITAL | Age: 72
Discharge: HOME | End: 2022-06-24

## 2022-06-24 DIAGNOSIS — R06.02 SOB (SHORTNESS OF BREATH): ICD-10-CM

## 2022-06-24 PROCEDURE — 78452 HT MUSCLE IMAGE SPECT MULT: CPT | Performed by: INTERNAL MEDICINE

## 2022-06-24 PROCEDURE — 93018 CV STRESS TEST I&R ONLY: CPT | Performed by: INTERNAL MEDICINE

## 2022-06-24 PROCEDURE — A9500 TC99M SESTAMIBI: HCPCS | Performed by: INTERNAL MEDICINE

## 2022-06-24 PROCEDURE — 78452 HT MUSCLE IMAGE SPECT MULT: CPT

## 2022-06-24 PROCEDURE — 93017 CV STRESS TEST TRACING ONLY: CPT

## 2022-06-24 PROCEDURE — 0 TECHNETIUM SESTAMIBI: Performed by: INTERNAL MEDICINE

## 2022-06-24 RX ADMIN — TECHNETIUM TC 99M SESTAMIBI 1 DOSE: 1 INJECTION INTRAVENOUS at 09:54

## 2022-06-24 RX ADMIN — TECHNETIUM TC 99M SESTAMIBI 1 DOSE: 1 INJECTION INTRAVENOUS at 11:46

## 2022-06-25 LAB
BH CV REST NUCLEAR ISOTOPE DOSE: 10 MCI
BH CV STRESS NUCLEAR ISOTOPE DOSE: 30 MCI
BH CV STRESS RECOVERY BP: NORMAL MMHG
BH CV STRESS RECOVERY HR: 67 BPM
LV EF NUC BP: 56 %
MAXIMAL PREDICTED HEART RATE: 150 BPM
PERCENT MAX PREDICTED HR: 91.33 %
STRESS BASELINE BP: NORMAL MMHG
STRESS BASELINE HR: 56 BPM
STRESS PERCENT HR: 107 %
STRESS POST ESTIMATED WORKLOAD: 7 METS
STRESS POST EXERCISE DUR MIN: 4 MIN
STRESS POST PEAK BP: NORMAL MMHG
STRESS POST PEAK HR: 137 BPM
STRESS TARGET HR: 128 BPM

## 2022-07-01 ENCOUNTER — TELEPHONE (OUTPATIENT)
Dept: CARDIOLOGY | Facility: CLINIC | Age: 72
End: 2022-07-01

## 2022-12-13 ENCOUNTER — APPOINTMENT (OUTPATIENT)
Dept: WOMENS IMAGING | Facility: HOSPITAL | Age: 72
End: 2022-12-13

## 2022-12-13 PROCEDURE — 77063 BREAST TOMOSYNTHESIS BI: CPT | Performed by: RADIOLOGY

## 2022-12-13 PROCEDURE — 77067 SCR MAMMO BI INCL CAD: CPT | Performed by: RADIOLOGY

## 2023-04-10 ENCOUNTER — OFFICE VISIT (OUTPATIENT)
Dept: NEUROSURGERY | Facility: CLINIC | Age: 73
End: 2023-04-10
Payer: MEDICARE

## 2023-04-10 VITALS
WEIGHT: 175.4 LBS | SYSTOLIC BLOOD PRESSURE: 130 MMHG | DIASTOLIC BLOOD PRESSURE: 76 MMHG | BODY MASS INDEX: 31.08 KG/M2 | HEIGHT: 63 IN

## 2023-04-10 DIAGNOSIS — M79.7 FIBROMYALGIA: ICD-10-CM

## 2023-04-10 DIAGNOSIS — M54.42 CHRONIC BILATERAL LOW BACK PAIN WITH BILATERAL SCIATICA: Primary | ICD-10-CM

## 2023-04-10 DIAGNOSIS — G89.29 CHRONIC BILATERAL LOW BACK PAIN WITH BILATERAL SCIATICA: Primary | ICD-10-CM

## 2023-04-10 DIAGNOSIS — E66.09 CLASS 1 OBESITY DUE TO EXCESS CALORIES WITH SERIOUS COMORBIDITY AND BODY MASS INDEX (BMI) OF 31.0 TO 31.9 IN ADULT: ICD-10-CM

## 2023-04-10 DIAGNOSIS — M54.41 CHRONIC BILATERAL LOW BACK PAIN WITH BILATERAL SCIATICA: Primary | ICD-10-CM

## 2023-04-10 PROBLEM — E66.811 CLASS 1 OBESITY DUE TO EXCESS CALORIES WITH SERIOUS COMORBIDITY AND BODY MASS INDEX (BMI) OF 31.0 TO 31.9 IN ADULT: Status: ACTIVE | Noted: 2023-04-10

## 2023-04-10 PROCEDURE — 99204 OFFICE O/P NEW MOD 45 MIN: CPT | Performed by: NEUROLOGICAL SURGERY

## 2023-04-10 RX ORDER — TRAMADOL HYDROCHLORIDE 50 MG/1
50 TABLET ORAL AS NEEDED
COMMUNITY

## 2023-04-10 RX ORDER — FLUTICASONE PROPIONATE 50 MCG
SPRAY, SUSPENSION (ML) NASAL
COMMUNITY
Start: 2023-03-23

## 2023-04-10 RX ORDER — LEVOTHYROXINE SODIUM 0.07 MG/1
TABLET ORAL
COMMUNITY

## 2023-04-10 RX ORDER — TIOTROPIUM BROMIDE INHALATION SPRAY 3.12 UG/1
SPRAY, METERED RESPIRATORY (INHALATION)
COMMUNITY
Start: 2023-04-03

## 2023-04-10 NOTE — PROGRESS NOTES
Subjective     Chief Complaint: Back pain    Patient ID: Shelly Stevens is a 72 y.o. female seen for consultation today at the request of  TIARA Vicente    History of Present Illness    This is a 72-year-old woman who presents to my office with chief complaints of a chronic, longstanding history of intermittent and progressive low back pain.  She endorses worsening pain in the last few months which has begun to radiate into her buttocks and occasionally down into her right leg.  She has tried physical therapy and chiropractic manipulation without relief.  She does not have much in the way of medical comorbidities other than some mild obesity and fibromyalgia for which she is not currently receiving any care.    The following portions of the patient's history were reviewed and updated as appropriate: allergies, current medications, past family history, past medical history, past social history, past surgical history and problem list.    Family history:   Family History   Problem Relation Age of Onset   • COPD Mother    • Asthma Mother    • Arthritis Mother    • Hyperlipidemia Mother    • Heart attack Maternal Grandfather        Social history:   Social History     Socioeconomic History   • Marital status:    Tobacco Use   • Smoking status: Former     Types: Cigarettes     Quit date:      Years since quittin.3   • Smokeless tobacco: Never   Vaping Use   • Vaping Use: Never used   Substance and Sexual Activity   • Alcohol use: Never   • Drug use: Never   • Sexual activity: Defer       Review of Systems   Constitutional: Positive for activity change, chills and fatigue. Negative for appetite change, diaphoresis, fever and unexpected weight change.   HENT: Positive for congestion, ear pain, hearing loss, nosebleeds, postnasal drip, sinus pressure, sinus pain, sneezing and sore throat. Negative for dental problem, drooling, ear discharge, facial swelling, mouth sores, rhinorrhea, tinnitus,  "trouble swallowing and voice change.    Eyes: Positive for itching. Negative for photophobia, pain, discharge, redness and visual disturbance.   Respiratory: Negative for apnea, cough, choking, chest tightness, shortness of breath, wheezing and stridor.    Cardiovascular: Positive for palpitations. Negative for chest pain and leg swelling.   Gastrointestinal: Positive for abdominal pain. Negative for abdominal distention, anal bleeding, blood in stool, constipation, diarrhea, nausea, rectal pain and vomiting.   Endocrine: Positive for cold intolerance. Negative for heat intolerance, polydipsia, polyphagia and polyuria.   Genitourinary: Positive for frequency, pelvic pain and urgency. Negative for decreased urine volume, difficulty urinating, dysuria, enuresis, flank pain, genital sores and hematuria.   Musculoskeletal: Positive for back pain, neck pain and neck stiffness. Negative for arthralgias, gait problem, joint swelling and myalgias.   Skin: Negative for color change, pallor, rash and wound.   Allergic/Immunologic: Positive for environmental allergies. Negative for food allergies and immunocompromised state.   Neurological: Positive for tremors, weakness, light-headedness and headaches. Negative for dizziness, seizures, syncope, facial asymmetry, speech difficulty and numbness.   Hematological: Negative for adenopathy. Does not bruise/bleed easily.   Psychiatric/Behavioral: Positive for agitation, decreased concentration and sleep disturbance. Negative for behavioral problems, confusion, dysphoric mood, hallucinations, self-injury and suicidal ideas. The patient is nervous/anxious. The patient is not hyperactive.    All other systems reviewed and are negative.      Objective   Blood pressure 130/76, height 160 cm (63\"), weight 79.6 kg (175 lb 6.4 oz).  Body mass index is 31.07 kg/m².    Physical Exam  Constitutional:       General: She is not in acute distress.     Appearance: She is well-developed. She is not " diaphoretic.   HENT:      Head: Normocephalic and atraumatic.   Pulmonary:      Effort: Pulmonary effort is normal.   Skin:     General: Skin is warm and dry.   Neurological:      Mental Status: She is alert and oriented to person, place, and time.      Cranial Nerves: No cranial nerve deficit.      Coordination: Romberg sign negative.      Gait: Gait normal.      Deep Tendon Reflexes:      Reflex Scores:       Patellar reflexes are 2+ on the right side and 2+ on the left side.       Achilles reflexes are 2+ on the right side and 2+ on the left side.     Comments: Muscle stretch reflexes are symmetric and within normal limits in the bilateral lower extremities.  She has decreased range of motion with her hips particularly with external rotation although she is not tender over her greater trochanteric bursa.  She does have tenderness over the quadriceps and iliotibial bands bilaterally.  Casual, toe raise, heel raise gait are all performed unremarkably.  Station is intact         Assessment & Plan     Independent Review of Radiographic Studies:      Available for my review is a MRI of the lumbar spine which was performed on 2/23/2023.  There is diffuse degenerative disc disease most notably at L3-4.  There is moderate bordering on severe lateral recess stenosis.  There is some eccentric spurring at L4-5 which is slightly worse on the left as compared to the right.  There is a disc herniation at this level which does appear to contact the descending L5 nerve root.    Medical Decision Making:      This is a 72-year-old woman with chronic low back pain.  She certainly could have a component of neurogenic claudication.  I would like to get a lumbar myelogram I will follow-up with her once this has been done.  I have also made a referral to pain psychology as her fibromyalgia symptoms appear to be poorly controlled at the present time.    Diagnoses and all orders for this visit:    1. Chronic bilateral low back pain with  bilateral sciatica (Primary)    2. Fibromyalgia  -     Ambulatory Referral to Psychology    3. Class 1 obesity due to excess calories with serious comorbidity and body mass index (BMI) of 31.0 to 31.9 in adult    Other orders  -     MRI outside films; Future        No follow-ups on file.           This document signed by SARI Man MD April 10, 2023 13:37 EDT

## 2023-04-13 DIAGNOSIS — M54.42 CHRONIC BILATERAL LOW BACK PAIN WITH BILATERAL SCIATICA: Primary | ICD-10-CM

## 2023-04-13 DIAGNOSIS — G89.29 CHRONIC BILATERAL LOW BACK PAIN WITH BILATERAL SCIATICA: Primary | ICD-10-CM

## 2023-04-13 DIAGNOSIS — M54.41 CHRONIC BILATERAL LOW BACK PAIN WITH BILATERAL SCIATICA: Primary | ICD-10-CM

## 2023-04-24 ENCOUNTER — OFFICE VISIT (OUTPATIENT)
Dept: NEUROSURGERY | Facility: CLINIC | Age: 73
End: 2023-04-24
Payer: MEDICARE

## 2023-04-24 VITALS — WEIGHT: 175.49 LBS | BODY MASS INDEX: 31.09 KG/M2 | HEIGHT: 63 IN

## 2023-04-24 DIAGNOSIS — M48.062 SPINAL STENOSIS, LUMBAR REGION, WITH NEUROGENIC CLAUDICATION: Primary | ICD-10-CM

## 2023-04-24 PROCEDURE — 1159F MED LIST DOCD IN RCRD: CPT | Performed by: RADIOLOGY

## 2023-04-24 PROCEDURE — 99442 PR PHYS/QHP TELEPHONE EVALUATION 11-20 MIN: CPT | Performed by: RADIOLOGY

## 2023-04-24 PROCEDURE — 1160F RVW MEDS BY RX/DR IN RCRD: CPT | Performed by: RADIOLOGY

## 2023-04-24 RX ORDER — CYCLOSPORINE 0.5 MG/ML
EMULSION OPHTHALMIC
COMMUNITY
Start: 2023-04-13

## 2023-04-24 NOTE — PROGRESS NOTES
"NAME: SHABNAM AGUIRRE   DOS: 2023  : 1950  PCP: Joseline Anderson APRN    Chief Complaint:    Chief Complaint   Patient presents with   • Follow-up     Discuss myelogram, back pain     You have chosen to receive care through a telephone visit. Do you consent to use a telephone visit for your medical care today? Yes    History of Present Illness:  72 y.o. female who was recently seen by one of my neurosurgical partners (Dr. Man) for progressive back and bilateral leg pain/sciatica, with concern for neurogenic claudication.  She has MRI examination demonstrating disease at the L3/4 level, with facet arthropathy.  Dr. Man has recommended a lumbar myelogram (with upright flexion/extension imaging), to further delineate her lumbar degenerative disease and triage future care.  Ms. Aguirre is concerned about the lumbar myelogram, and wished to have a \"telephone visit\" with me today to further discuss the procedure.    Past Medical History:  Past Medical History:   Diagnosis Date   • Anemia    • Asthma    • Back problem    • Bronchitis    • Claustrophobia    • COVID-19 2021   • Headache    • HL (hearing loss)    • Pre-diabetes    • Thyroid disease    • Tremors of nervous system        Past Surgical History:  Past Surgical History:   Procedure Laterality Date   • GALLBLADDER SURGERY     • HYSTERECTOMY     • SPHENOIDECTOMY     • TONSILLECTOMY         Review of Systems:        Review of Systems   Constitutional: Negative for activity change, appetite change, chills, diaphoresis, fatigue, fever and unexpected weight change.   HENT: Negative for congestion, dental problem, drooling, ear discharge, ear pain, facial swelling, hearing loss, mouth sores, nosebleeds, postnasal drip, rhinorrhea, sinus pressure, sinus pain, sneezing, sore throat, tinnitus, trouble swallowing and voice change.    Eyes: Negative for photophobia, pain, discharge, redness, itching and visual disturbance.   Respiratory: Negative for " apnea, cough, choking, chest tightness, shortness of breath, wheezing and stridor.    Cardiovascular: Negative for chest pain, palpitations and leg swelling.   Gastrointestinal: Negative for abdominal distention, abdominal pain, anal bleeding, blood in stool, constipation, diarrhea, nausea, rectal pain and vomiting.   Endocrine: Negative for cold intolerance, heat intolerance, polydipsia, polyphagia and polyuria.   Genitourinary: Negative for decreased urine volume, difficulty urinating, dyspareunia, dysuria, enuresis, flank pain, frequency, genital sores, hematuria, menstrual problem, pelvic pain, urgency, vaginal bleeding, vaginal discharge and vaginal pain.   Musculoskeletal: Positive for back pain and myalgias. Negative for gait problem, joint swelling, neck pain and neck stiffness.   Skin: Negative for color change, pallor, rash and wound.   Allergic/Immunologic: Negative for environmental allergies, food allergies and immunocompromised state.   Neurological: Negative for dizziness, tremors, seizures, syncope, facial asymmetry, speech difficulty, weakness, light-headedness, numbness and headaches.   Hematological: Negative for adenopathy. Does not bruise/bleed easily.   Psychiatric/Behavioral: Negative for agitation, behavioral problems, confusion, decreased concentration, dysphoric mood, hallucinations, self-injury, sleep disturbance and suicidal ideas. The patient is not nervous/anxious and is not hyperactive.         Medications    Current Outpatient Medications:   •  albuterol (ProAir RespiClick) 108 (90 Base) MCG/ACT inhaler, ProAir RespiClick 90 mcg/actuation breath activated  Inhale 1 puff every 4-6 hours by inhalation route as needed for 30 days., Disp: , Rfl:   •  ammonium lactate (LAC-HYDRIN) 12 % lotion, Apply  topically to the appropriate area as directed As Needed., Disp: , Rfl:   •  clonazePAM (KlonoPIN) 0.5 MG tablet, Take 1 tablet by mouth At Night As Needed., Disp: , Rfl:   •  cycloSPORINE  (RESTASIS) 0.05 % ophthalmic emulsion, , Disp: , Rfl:   •  fluticasone (FLONASE) 50 MCG/ACT nasal spray, , Disp: , Rfl:   •  ketoconazole (NIZORAL) 2 % shampoo, , Disp: , Rfl:   •  levocetirizine (XYZAL) 5 MG tablet, Take 1 tablet by mouth As Needed., Disp: , Rfl:   •  levothyroxine (SYNTHROID, LEVOTHROID) 75 MCG tablet, , Disp: , Rfl:   •  melatonin 5 MG tablet tablet, Take 1 tablet by mouth., Disp: , Rfl:   •  montelukast (SINGULAIR) 10 MG tablet, Take 1 tablet by mouth Every Night., Disp: , Rfl:   •  multivitamin with minerals tablet tablet, Take 1 tablet by mouth Daily., Disp: , Rfl:   •  olopatadine (PATANOL) 0.1 % ophthalmic solution, 1 drop 2 (Two) Times a Day As Needed for Allergies., Disp: , Rfl:   •  Omega-3 Fatty Acids (fish oil) 1000 MG capsule capsule, Take  by mouth Daily With Breakfast., Disp: , Rfl:   •  pitavastatin calcium (Livalo) 2 MG tablet tablet, Take 1 tablet by mouth Every Night., Disp: 90 tablet, Rfl: 6  •  Probiotic Product (PROBIOTIC-10 PO), Take  by mouth 2 (Two) Times a Week., Disp: , Rfl:   •  Spiriva Respimat 2.5 MCG/ACT aerosol solution inhaler, , Disp: , Rfl:   •  Sulfacetamide Sodium, Acne, 10 % lotion, , Disp: , Rfl:   •  SYNTHROID 75 MCG tablet, , Disp: , Rfl:   •  traMADol (ULTRAM) 50 MG tablet, Take 1 tablet by mouth As Needed for Moderate Pain., Disp: , Rfl:   •  TURMERIC CURCUMIN PO, Take  by mouth 1 (One) Time Per Week., Disp: , Rfl:     Allergies:  Allergies   Allergen Reactions   • Gentamicin Palpitations     Other reaction(s): VOMITING       Social Hx:  Social History     Tobacco Use   • Smoking status: Former     Types: Cigarettes     Quit date: 1970     Years since quittin.3   • Smokeless tobacco: Never   Vaping Use   • Vaping Use: Never used   Substance Use Topics   • Alcohol use: Never   • Drug use: Never       Family Hx:  Family History   Problem Relation Age of Onset   • COPD Mother    • Asthma Mother    • Arthritis Mother    • Hyperlipidemia Mother    • Heart  attack Maternal Grandfather        Review of Imaging:  MRI from February 2023 demonstrates multilevel degenerative changes, most pronounced at the L3/4 level where there is a moderate degree of central spinal stenosis and facet arthropathy.    Physical Examination:  Per Dr. Man's recent clinic visit:     Constitutional:       General: She is not in acute distress.     Appearance: She is well-developed. She is not diaphoretic.   HENT:      Head: Normocephalic and atraumatic.   Pulmonary:      Effort: Pulmonary effort is normal.   Skin:     General: Skin is warm and dry.   Neurological:      Mental Status: She is alert and oriented to person, place, and time.      Cranial Nerves: No cranial nerve deficit.      Coordination: Romberg sign negative.      Gait: Gait normal.      Deep Tendon Reflexes:      Reflex Scores:       Patellar reflexes are 2+ on the right side and 2+ on the left side.       Achilles reflexes are 2+ on the right side and 2+ on the left side.     Comments: Muscle stretch reflexes are symmetric and within normal limits in the bilateral lower extremities.  She has decreased range of motion with her hips particularly with external rotation although she is not tender over her greater trochanteric bursa.  She does have tenderness over the quadriceps and iliotibial bands bilaterally.  Casual, toe raise, heel raise gait are all performed unremarkably.  Station is intact       Diagnoses/Plan:    Ms. Stevens is a 72 y.o. female with progressive symptoms of low back pain with bilateral sciatica and probable neurogenic claudication.  She has been seen by my neurosurgical partner (Dr. Man) who recommended a lumbar myelogram.  Ms. Stevens is anxious and nervous about the myelogram, and wished to further discuss that with me today.  I went over in detail with her the myelogram procedure, and we would be able to give her some p.o. Valium the morning of the procedure.  The risks of the procedure were  discussed in detail with Ms. flores, in particular the risk of a spinal leak/headache (possibly necessitating a blood patch), and the possibility of exacerbating current symptoms.  She expressed an understanding and is going to contemplate the myelogram, and will contact our office if she wishes to proceed and schedule with the lumbar myelogram.      Copied text and portions of the note have been reviewed and are accurate as of 4/24/23.    Approximately 11 minutes was utilized during this telephone visit, to include (but not limited to): Review of prior imaging studies, discussing the procedure in detail, and reviewing the risks/benefits of the procedure.

## 2023-06-19 ENCOUNTER — OFFICE VISIT (OUTPATIENT)
Dept: CARDIOLOGY | Facility: CLINIC | Age: 73
End: 2023-06-19
Payer: MEDICARE

## 2023-06-19 VITALS
WEIGHT: 174 LBS | DIASTOLIC BLOOD PRESSURE: 72 MMHG | HEIGHT: 63 IN | OXYGEN SATURATION: 98 % | BODY MASS INDEX: 30.83 KG/M2 | SYSTOLIC BLOOD PRESSURE: 108 MMHG | HEART RATE: 65 BPM

## 2023-06-19 DIAGNOSIS — R00.2 PALPITATIONS: Primary | ICD-10-CM

## 2023-06-19 PROCEDURE — 99213 OFFICE O/P EST LOW 20 MIN: CPT | Performed by: PHYSICIAN ASSISTANT

## 2023-06-19 RX ORDER — TRIAMCINOLONE ACETONIDE 1 MG/G
CREAM TOPICAL
COMMUNITY
Start: 2023-05-22

## 2023-06-19 RX ORDER — FAMOTIDINE 20 MG/1
TABLET, FILM COATED ORAL
COMMUNITY
Start: 2023-06-06

## 2023-06-19 RX ORDER — PANTOPRAZOLE SODIUM 40 MG/1
TABLET, DELAYED RELEASE ORAL AS NEEDED
COMMUNITY
Start: 2023-06-15

## 2023-06-19 RX ORDER — OLMESARTAN MEDOXOMIL 5 MG/1
TABLET ORAL
COMMUNITY
Start: 2023-06-02

## 2023-06-19 RX ORDER — ROSUVASTATIN CALCIUM 10 MG/1
10 TABLET, COATED ORAL DAILY
COMMUNITY
Start: 2023-06-12

## 2023-06-19 RX ORDER — MOMETASONE FUROATE 220 UG/1
1 INHALANT RESPIRATORY (INHALATION) AS NEEDED
COMMUNITY
Start: 2023-06-09

## 2023-06-19 NOTE — PROGRESS NOTES
[facility] at [facility]   OFFICE NOTE      Shelly Stevens  1950  PCP: Joseline Anderson APRN    SUBJECTIVE:   Shelly Stevens is a 72 y.o. female seen for a follow up visit regarding the following:     CC: Vasovagal    HPI:   Pleasant 72-year-old female returns today for follow-up regarding vasovagal syncope, palpitations, labile blood pressure.  She tells me that recently blood pressure been elevated she started new medication Benicar titrate up to 10 m twice daily.  She is also been started on new statin therapy she did not like Livalo she is going back to Crestor therapy prescribed by her PCP.  She is not having chest pain or chest tightness/angina pectoris.  She is quite anxious about her blood pressure..  She does not have any heart failure dizziness near syncope at this point.    Cardiac PMH: (Old records have been reviewed and summarized below)  1. Near syncope-symptoms suggest Vasovagal syncope  a. Normal MPS 2019 J[place] nJosé  b. Echocardiogram EF Normal, diastolic dysfunction. Mild AI, MR. 2019  c. Negative 24 hour monitor Dr. Can  d. Negative Carotid  scan 2019 Dr. Can  2. Labile BP, Marginal SBP  3. Depression, Anxiety  4. Hypothyroidism  5. GERD  6. Asthma    Past Medical History, Past Surgical History, Family history, Social History, and Medications were all reviewed with the patient today and updated as necessary.       Current Outpatient Medications:   •  albuterol (ProAir RespiClick) 108 (90 Base) MCG/ACT inhaler, 1 puff Every 6 (Six) Hours As Needed., Disp: , Rfl:   •  Asmanex, 30 Metered Doses, 220 MCG/ACT inhaler, 1 puff As Needed., Disp: , Rfl:   •  clonazePAM (KlonoPIN) 0.5 MG tablet, Take 1 tablet by mouth At Night As Needed., Disp: , Rfl:   •  cycloSPORINE (RESTASIS) 0.05 % ophthalmic emulsion, , Disp: , Rfl:   •  famotidine (PEPCID) 20 MG tablet, , Disp: , Rfl:   •  fluticasone (FLONASE) 50 MCG/ACT nasal spray, , Disp: , Rfl:   •  levocetirizine (XYZAL) 5 MG  "tablet, Take 1 tablet by mouth As Needed., Disp: , Rfl:   •  levothyroxine (SYNTHROID, LEVOTHROID) 75 MCG tablet, , Disp: , Rfl:   •  melatonin 5 MG tablet tablet, Take 1 tablet by mouth., Disp: , Rfl:   •  montelukast (SINGULAIR) 10 MG tablet, Take 1 tablet by mouth Every Night., Disp: , Rfl:   •  multivitamin with minerals tablet tablet, Take 1 tablet by mouth Daily., Disp: , Rfl:   •  olmesartan (BENICAR) 5 MG tablet, , Disp: , Rfl:   •  olopatadine (PATANOL) 0.1 % ophthalmic solution, 1 drop 2 (Two) Times a Day As Needed for Allergies., Disp: , Rfl:   •  Omega-3 Fatty Acids (fish oil) 1000 MG capsule capsule, Take  by mouth Daily With Breakfast., Disp: , Rfl:   •  pantoprazole (PROTONIX) 40 MG EC tablet, As Needed., Disp: , Rfl:   •  rosuvastatin (CRESTOR) 10 MG tablet, Take 1 tablet by mouth Daily., Disp: , Rfl:   •  Spiriva Respimat 2.5 MCG/ACT aerosol solution inhaler, Inhale 2 puffs As Needed., Disp: , Rfl:   •  triamcinolone (KENALOG) 0.1 % cream, , Disp: , Rfl:   •  pitavastatin calcium (Livalo) 2 MG tablet tablet, Take 1 tablet by mouth Every Night. (Patient not taking: Reported on 6/19/2023), Disp: 90 tablet, Rfl: 6      Allergies   Allergen Reactions   • Gentamicin Palpitations     Other reaction(s): VOMITING         PHYSICAL EXAM:    /72 (BP Location: Right arm, Patient Position: Sitting)   Pulse 65   Ht 160 cm (63\")   Wt 78.9 kg (174 lb)   SpO2 98%   BMI 30.82 kg/m²        Wt Readings from Last 5 Encounters:   06/19/23 78.9 kg (174 lb)   04/24/23 79.6 kg (175 lb 7.8 oz)   04/10/23 79.6 kg (175 lb 6.4 oz)   06/15/22 78.5 kg (173 lb 1 oz)   05/31/22 78.5 kg (173 lb)       BP Readings from Last 5 Encounters:   06/19/23 108/72   04/10/23 130/76   05/31/22 138/66   03/23/21 110/64   02/23/21 120/74       General appearance - Alert, well appearing, and in no distress   Mental status - Affect appropriate to mood.  Eyes - Sclerae anicteric,  ENMT - Hearing grossly normal bilaterally, Dental hygiene " good.  Neck - Carotids upstroke normal bilaterally, no bruits, no JVD.  Resp - Clear to auscultation, no wheezes, rales or rhonchi, symmetric air entry.  Heart - Normal rate, regular rhythm, normal S1, S2, no murmurs, rubs, clicks or gallops.  GI - Soft, nontender, nondistended, no masses or organomegaly.  Neurological - Grossly intact - normal speech, no focal findings  Musculoskeletal - No joint tenderness, deformity or swelling, no muscular tenderness noted.  Extremities - Peripheral pulses normal, no pedal edema, no clubbing or cyanosis.  Skin - Normal coloration and turgor.  Psych -  oriented to person, place, and time.    Medical problems and test results were reviewed with the patient today.           ASSESSMENT   1.  Dysautonomia  2.  Labile blood pressure, she is on Benicar 10 mg twice daily.  She will target blood pressure less than 130 systolic.  3.  Dyslipidemia: She did not want take Livalo, she is agreeable to Crestor per her PCP she will start this and continue this for now.    PLAN  · Continue current medical therapy  · Return follow-up or office 6 months or sooner as needed    6/19/2023  14:01 EDT  Electronically signed by YUE Patino, 06/19/23, 4:17 PM EDT.

## 2023-08-29 ENCOUNTER — TELEPHONE (OUTPATIENT)
Dept: NEUROSURGERY | Facility: OTHER | Age: 73
End: 2023-08-29
Payer: MEDICARE

## 2023-08-29 NOTE — TELEPHONE ENCOUNTER
Caller: Shelly Stevens    Relationship: Self    Best call back number: [phone]      What is the best time to reach you: CALL IN THE AFTERNOON- OKAY TO M- CB    What was the call regarding: PATIENT CALLED IN TO SCHEDULE MYELOGRAM BUT SHE WOULD LIKE THE PROCEDURE TO HAPPEN POSSIBLY AFTER THE 1ST OF THE YEAR- PATIENT WAS ALSO INQUIRING HOW LONG THE MRI WILL LAST BEFORE SHE IS REQUIRED TO GET ANOTHER ONE-  PLEASE ADVISE- THANK YOU.

## 2023-08-30 NOTE — TELEPHONE ENCOUNTER
Spoke with the patient. Still undecided about having myelogram done. She'll call me back to schedule once she's ready.

## 2023-09-18 ENCOUNTER — TELEPHONE (OUTPATIENT)
Dept: CARDIOLOGY | Facility: CLINIC | Age: 73
End: 2023-09-18
Payer: MEDICARE

## 2023-09-18 NOTE — TELEPHONE ENCOUNTER
Caller: Shelly Stevens     Relationship: SELF    Best call back number: [phone]    PT WILL BE AT AN APPOINTMENT TODAY (09.18.23) 1:45-3:00.    What is your medical concern? PT HAS BEEN FEELING LIGHTHEADED AND HER HEART HAS BEEN RACING A LITTLE BIT. SHE HAS FELT CLOSE TO LOSING CONSCIOUSNESS. HER BLOOD PRESSURE HAS BEEN GOING UP AND DOWN. SHE IS VERY CONCERNED AND THINKS SHE NEEDS TO SEE HIM SOON. PLEASE REACH OUT TO ADVISE PT. SHE WILL BE IN THE OFFICE ON 09.20.23, WITH ANOTHER PT.     How long has this issue been going on? A WEEK OR SO.    Is your provider already aware of this issue? YES    Have you been treated for this issue? YES

## 2023-09-20 ENCOUNTER — OFFICE VISIT (OUTPATIENT)
Dept: CARDIOLOGY | Facility: CLINIC | Age: 73
End: 2023-09-20
Payer: MEDICARE

## 2023-09-20 ENCOUNTER — HOSPITAL ENCOUNTER (OUTPATIENT)
Dept: CARDIOLOGY | Facility: HOSPITAL | Age: 73
Discharge: HOME | End: 2023-09-20
Payer: MEDICARE

## 2023-09-20 ENCOUNTER — CLINICAL SUPPORT (OUTPATIENT)
Dept: CARDIOLOGY | Facility: HOSPITAL | Age: 73
End: 2023-09-20
Payer: MEDICARE

## 2023-09-20 VITALS
WEIGHT: 172 LBS | HEIGHT: 63 IN | OXYGEN SATURATION: 98 % | SYSTOLIC BLOOD PRESSURE: 102 MMHG | BODY MASS INDEX: 30.48 KG/M2 | DIASTOLIC BLOOD PRESSURE: 48 MMHG | HEART RATE: 68 BPM

## 2023-09-20 DIAGNOSIS — R55 VASOVAGAL SYNCOPE: ICD-10-CM

## 2023-09-20 DIAGNOSIS — R55 VASOVAGAL SYNCOPE: Primary | ICD-10-CM

## 2023-09-20 DIAGNOSIS — R03.0 ELEVATED BLOOD-PRESSURE READING, WITHOUT DIAGNOSIS OF HYPERTENSION: ICD-10-CM

## 2023-09-20 DIAGNOSIS — I10 PRIMARY HYPERTENSION: ICD-10-CM

## 2023-09-20 DIAGNOSIS — I15.0 RENOVASCULAR HYPERTENSION: ICD-10-CM

## 2023-09-20 PROCEDURE — 93786 AMBL BP MNTR W/SW REC ONLY: CPT

## 2023-09-20 PROCEDURE — 99214 OFFICE O/P EST MOD 30 MIN: CPT | Performed by: PHYSICIAN ASSISTANT

## 2023-09-20 RX ORDER — BETAMETHASONE DIPROPIONATE 0.5 MG/G
1 CREAM TOPICAL DAILY
COMMUNITY
Start: 2023-07-25

## 2023-09-20 RX ORDER — PROPRANOLOL HYDROCHLORIDE 10 MG/1
10 TABLET ORAL DAILY PRN
Qty: 30 TABLET | Refills: 0 | Status: SHIPPED | OUTPATIENT
Start: 2023-09-20

## 2023-09-20 RX ORDER — HYDROCHLOROTHIAZIDE 12.5 MG/1
12.5 TABLET ORAL DAILY
COMMUNITY
Start: 2023-09-01

## 2023-09-20 NOTE — PROGRESS NOTES
Shelly Stevens  : 1950  MRN: [id number]  Today's Date: 23    Bedtime __________    I woke up at _______      [facility]  [address] [place] [address], [address] [address]  [phone]    During the day, the monitor will pump air and the cuff will inflate approximately every twenty minutes.  In the evening, the pump-up interval changes to every thirty minutes.  In the late evening (9:00 p.m.--10:00 p.m.), the cuff will inflate every hour until 8:00 a.m. the following morning when the twenty-minute interval begins again.    When you feel the cuff inflating, stop what you are doing, straighten your arm, and be still.  If while the cuff is inflated, your arm is bent or there is too much movement, the cuff will re-inflate and attempt another reading.  When the cuff deflates, resume your normal activity.    The monitor is self-contained and records and displays all readings.  Every time the cuff deflates, your blood pressure and heart rate will be displayed twice.    If you bathe or change clothing, remove the monitor.  It is difficult to re-wrap or re-apply the cuff; before removing it, make sure someone is available to  help you.  Whether the cuff is on your left or right arm, the gray tube must be directly above the bend of your elbow.    If the cuff inflates when it is not wrapped around your arm, let it deflate and disconnect it from the black tube, push out any remaining air, reconnect the tubing, and wrap it around your arm again.  The monitor will resume readings at the next proper time.    After wearing the cuff for twenty-four hours, turn the monitor off by holding the button for several seconds, or by removing the batteries.            BPMONITORINSTRUCTIONS 2019                  Ambulatory Blood Pressure Monitor Patient Agreement    I, Shelly Stevens, 1950, [id number] assume full responsibility for the safekeeping and care of the monitor while it  is in my possession.      I have been advised that it is not waterproof and that any water that comes in contact with the monitor may cause damage that could require the unit to be replaced.    Until I return the monitor and its attachments to [facility], I will assume responsibility for any damage to the monitor due to neglect or misuse of same.    I understand that I am responsible for returning the monitor or I will be responsible for all costs for replacing the equipment.  Failure to return the monitor will result in a replacement charge of $1800.00 which will be billed to me five business days following the date of hookup.    Unless instructed otherwise, I will wear the monitor for 24 hours.    I was given the opportunity to ask questions and left the office with the device instructions.    I will return the monitor no later than  to:    [facility], [address] [place] [address], [address], [place] [address]    Date of Hookup: 09/20/23    Ordered by: Rip Turner    Hooked up by: Lawanda Sequeira MA Cuff placed on left arm.    Serial Number: [id number]    Termination Date: 9/21/23  Time: 1530    Patient or Guardian Signature: ______________________, 09/20/23    Date Monitor Returned: __________________            BPMONITORINSTRUCTIONS 8.12.2019

## 2023-09-20 NOTE — PROGRESS NOTES
[facility]   OFFICE NOTE      Shelly Stevens  1950  PCP: Joseline Anderson APRN    SUBJECTIVE:   Shelly Stevens is a 72 y.o. female seen for a follow up visit regarding the following:     CC: Vasovagal    HPI:   Pleasant 72-year-old female returns today for follow-up regarding vasovagal syncope, palpitations, labile blood pressure.  She presents after a year hiatus for further evaluation.  States he continues have difficult time with labile blood pressure.  She is taking hydrochlorothiazide she is taking her blood pressure at home with a wrist cuff.  She was getting high readings therefore she went to her PCP and started olmesartan therapy.  After taking olmesartan she started having low blood pressure episodes.  She is not having chest pain or chest tightness suggesting angina denies heart failure symptoms.  She presents today for further recommendations regarding her labile blood pressure.  She does continue to have some dysautonomia type symptoms with blood pressure gets low she gets palpitations dizziness lightheadedness.      Cardiac PMH: (Old records have been reviewed and summarized below)  Near syncope-symptoms suggest Vasovagal syncope  Normal MPS 2019 Can  Echocardiogram EF Normal, diastolic dysfunction. Mild AI, MR. 2019  Negative 24 hour monitor Dr. Can  Negative Carotid  scan 2019 Dr. Can  Labile BP, Marginal SBP  Depression, Anxiety  Hypothyroidism  GERD  Asthma    Past Medical History, Past Surgical History, Family history, Social History, and Medications were all reviewed with the patient today and updated as necessary.       Current Outpatient Medications:     albuterol (ProAir RespiClick) 108 (90 Base) MCG/ACT inhaler, 1 puff Every 6 (Six) Hours As Needed., Disp: , Rfl:     Asmanex, 30 Metered Doses, 220 MCG/ACT inhaler, 1 puff As Needed., Disp: , Rfl:     betamethasone dipropionate 0.05 % cream, Apply 1 application  topically to the appropriate  "area as directed Daily., Disp: , Rfl:     cycloSPORINE (RESTASIS) 0.05 % ophthalmic emulsion, , Disp: , Rfl:     famotidine (PEPCID) 20 MG tablet, , Disp: , Rfl:     fluticasone (FLONASE) 50 MCG/ACT nasal spray, , Disp: , Rfl:     hydroCHLOROthiazide (HYDRODIURIL) 12.5 MG tablet, Take 1 tablet by mouth Daily., Disp: , Rfl:     levocetirizine (XYZAL) 5 MG tablet, Take 1 tablet by mouth As Needed., Disp: , Rfl:     levothyroxine (SYNTHROID, LEVOTHROID) 75 MCG tablet, , Disp: , Rfl:     melatonin 5 MG tablet tablet, Take 1 tablet by mouth., Disp: , Rfl:     montelukast (SINGULAIR) 10 MG tablet, Take 1 tablet by mouth Every Night., Disp: , Rfl:     multivitamin with minerals tablet tablet, Take 1 tablet by mouth Daily., Disp: , Rfl:     olmesartan (BENICAR) 5 MG tablet, , Disp: , Rfl:     olopatadine (PATANOL) 0.1 % ophthalmic solution, 1 drop 2 (Two) Times a Day As Needed for Allergies., Disp: , Rfl:     Omega-3 Fatty Acids (fish oil) 1000 MG capsule capsule, Take  by mouth Daily With Breakfast., Disp: , Rfl:     pantoprazole (PROTONIX) 40 MG EC tablet, As Needed., Disp: , Rfl:     triamcinolone (KENALOG) 0.1 % cream, , Disp: , Rfl:     clonazePAM (KlonoPIN) 0.5 MG tablet, Take 1 tablet by mouth At Night As Needed., Disp: , Rfl:     rosuvastatin (CRESTOR) 10 MG tablet, Take 1 tablet by mouth Daily., Disp: , Rfl:     Spiriva Respimat 2.5 MCG/ACT aerosol solution inhaler, Inhale 2 puffs As Needed., Disp: , Rfl:       Allergies   Allergen Reactions    Gentamicin Palpitations     Other reaction(s): VOMITING         PHYSICAL EXAM:    /48 (BP Location: Right arm, Patient Position: Sitting)   Pulse 68   Ht 160 cm (62.99\")   Wt 78 kg (172 lb)   SpO2 98%   BMI 30.48 kg/m²        Wt Readings from Last 5 Encounters:   09/20/23 78 kg (172 lb)   06/19/23 78.9 kg (174 lb)   04/24/23 79.6 kg (175 lb 7.8 oz)   04/10/23 79.6 kg (175 lb 6.4 oz)   06/15/22 78.5 kg (173 lb 1 oz)       BP Readings from Last 5 Encounters: "   09/20/23 102/48   06/19/23 108/72   04/10/23 130/76   05/31/22 138/66   03/23/21 110/64       General appearance - Alert, well appearing, and in no distress   Mental status - Affect appropriate to mood.  Eyes - Sclerae anicteric,  ENMT - Hearing grossly normal bilaterally, Dental hygiene good.  Neck - Carotids upstroke normal bilaterally, no bruits, no JVD.  Resp - Clear to auscultation, no wheezes, rales or rhonchi, symmetric air entry.  Heart - Normal rate, regular rhythm, normal S1, S2, no murmurs, rubs, clicks or gallops.  GI - Soft, nontender, nondistended, no masses or organomegaly.  Neurological - Grossly intact - normal speech, no focal findings  Musculoskeletal - No joint tenderness, deformity or swelling, no muscular tenderness noted.  Extremities - Peripheral pulses normal, no pedal edema, no clubbing or cyanosis.  Skin - Normal coloration and turgor.  Psych -  oriented to person, place, and time.    Medical problems and test results were reviewed with the patient today.           ASSESSMENT   1.  Dysautonomia avoid hypotensive episodes, aggressive hydration fluids Gatorade Powerade  2.  Labile blood pressure,: Suspect labile blood pressure due to her inaccurate blood pressure cuff at home which is a wrist cuff at home.  We have asked her to buy a  more accurate blood pressure cuff she also wear a 24-hour amatory blood pressure cuff with heart valve center today.  She she also gets anxious when her blood pressure is elevated we will try as needed propanolol therapy.  3.  Dyslipidemia: She did not want take Livalo, she is agreeable to Crestor per her PCP she will start this and continue this for now.    PLAN  24 blood pressure cuff monitoring with heart valve center  Stop Olmesartan, Aggressive hydration, fluids  Propanolol 10mg as needed for palpitations, or Labile HTN SBP>140mmHg  Follow up as scheduled.   Electronically signed by YUE Patino, 09/20/23, 3:09 PM EDT.

## 2023-09-21 ENCOUNTER — TELEPHONE (OUTPATIENT)
Dept: CARDIOLOGY | Facility: HOSPITAL | Age: 73
End: 2023-09-21
Payer: MEDICARE

## 2023-09-21 NOTE — TELEPHONE ENCOUNTER
Pt came into our office on 9/20/23 for a 24 hour blood pressure monitor. She was driving and wanted to place the blood pressure monitor on when she got home. I went over the instructions and also pt had instructions given to her.     On 9/21/23 pt called and stated that she placed blood pressure monitor on her arm, turned on machine and machine made the sound as if it was pumping but nothing happened. Left it on her arm to see if it might take her blood pressure but nothing happened. She stated she is not able to come back to [place] to have it replaced. She stated that she will place the monitor in the envelope given to return it.

## 2023-09-25 NOTE — TELEPHONE ENCOUNTER
Caller: Shelly Stevens    Relationship: Self    Best call back number: [phone]    What is the best time to reach you: ANYTIME OR VM     Who are you requesting to speak with (clinical staff, provider,  specific staff member): ANYONE     What was the call regarding: PATIENT WOULD LIKE TO MAKE SURE THAT THE OFFICE RECEIVED HER HOLTER MONITOR.

## 2023-09-25 NOTE — TELEPHONE ENCOUNTER
Spoke to pt to let her know we received monitor and the signature paper was mailed to her as a receipt. No further questions or concerns at this time.

## 2023-11-27 ENCOUNTER — OFFICE VISIT (OUTPATIENT)
Dept: CARDIOLOGY | Facility: CLINIC | Age: 73
End: 2023-11-27
Payer: MEDICARE

## 2023-11-27 VITALS
HEART RATE: 54 BPM | DIASTOLIC BLOOD PRESSURE: 88 MMHG | HEIGHT: 63 IN | SYSTOLIC BLOOD PRESSURE: 160 MMHG | BODY MASS INDEX: 30.83 KG/M2 | WEIGHT: 174 LBS

## 2023-11-27 DIAGNOSIS — E03.9 HYPOTHYROIDISM, UNSPECIFIED TYPE: ICD-10-CM

## 2023-11-27 DIAGNOSIS — E78.00 HYPERCHOLESTEROLEMIA: ICD-10-CM

## 2023-11-27 DIAGNOSIS — R53.82 CHRONIC FATIGUE: ICD-10-CM

## 2023-11-27 DIAGNOSIS — R73.9 HYPERGLYCEMIA: ICD-10-CM

## 2023-11-27 DIAGNOSIS — E88.810 METABOLIC SYNDROME: ICD-10-CM

## 2023-11-27 DIAGNOSIS — I10 PRIMARY HYPERTENSION: Primary | ICD-10-CM

## 2023-11-27 DIAGNOSIS — R06.02 SHORTNESS OF BREATH: ICD-10-CM

## 2023-11-27 PROCEDURE — 1160F RVW MEDS BY RX/DR IN RCRD: CPT | Performed by: INTERNAL MEDICINE

## 2023-11-27 PROCEDURE — 99214 OFFICE O/P EST MOD 30 MIN: CPT | Performed by: INTERNAL MEDICINE

## 2023-11-27 PROCEDURE — 3077F SYST BP >= 140 MM HG: CPT | Performed by: INTERNAL MEDICINE

## 2023-11-27 PROCEDURE — 93000 ELECTROCARDIOGRAM COMPLETE: CPT | Performed by: INTERNAL MEDICINE

## 2023-11-27 PROCEDURE — 3079F DIAST BP 80-89 MM HG: CPT | Performed by: INTERNAL MEDICINE

## 2023-11-27 PROCEDURE — 1159F MED LIST DOCD IN RCRD: CPT | Performed by: INTERNAL MEDICINE

## 2023-11-27 RX ORDER — LEVOCETIRIZINE DIHYDROCHLORIDE 5 MG/1
5 TABLET, FILM COATED ORAL
Status: SHIPPED | COMMUNITY
Start: 2023-11-27

## 2023-11-27 RX ORDER — OLMESARTAN MEDOXOMIL 20 MG/1
20 TABLET ORAL DAILY PRN
COMMUNITY
End: 2023-11-27 | Stop reason: SDUPTHER

## 2023-11-27 RX ORDER — OLMESARTAN MEDOXOMIL 20 MG/1
20 TABLET ORAL DAILY PRN
Qty: 90 TABLET | Refills: 3 | Status: SHIPPED | OUTPATIENT
Start: 2023-11-27

## 2023-11-27 NOTE — PROGRESS NOTES
"Chief Complaint   Patient presents with   • Establish Care     PCP referred, chest pressure, HTN, SOB, palpitations. Pt had been followed by [facility] for the palpitations / bradycardia/ near syncope.  Reports having been seen here years ago.    • Chest Pain     \" A few little pains in chart, worse when my asthma is acting up\"   • Shortness of Breath     Worse with climbing stairs, reports Dr Donohue telling her that Covid had caused some scaring.    • Labs     PCP checked labs last in June, results in chart. Allergist checked other labs later.    • Hypertension     Reports having had Covid in July 2021 and mono throughout the year 2022. Feels her HTN this year is related to these. Up to 160/90's.  PT only takes olmesartan and HCTZ PRN ONLY due to it dropping her BP to low if she takes it daily. 90/60's   • statin intolerant     Asking about injections due to statin intolerants. She would like to do at the diagnostic center. She has a zetia script to start now but wanted to ask  you first.         CARDIAC COMPLAINTS  chest pressure/discomfort, dyspnea, fatigue, and palpitations      Subjective   Shelly Stevens is a 72 y.o. female came in today for her initial dental cardiology evaluation.  She has history of hypertension, hypothyroidism, hypercholesterolemia and hyperglycemia.  She has been followed by an electrophysiologist for palpitation and dizziness along with near syncope.  Her echocardiogram showed normal LV systolic function and trace to mild MR and AI.  Her stress test showed hypertensive blood pressure response.  She also wore a monitor which apparently showed some SVT.  It was felt that she may have vasovagal episodes and because of the bradycardia beta-blockers were not added.  She was put on low-dose of hydrochlorothiazide as well as an ARB.  She does not take the ARB regularly since she takes blood pressure at least 5-6 times a day and worried that she may drop her blood pressure.  Only if the systolic " is more than 160 then she takes 20 mg of Benicar.  She apparently had COVID infection 2 years ago after which she has been noticing chest pain, shortness of breath and palpitation.  The chest pain is mostly when she takes a deep breath especially if she has asthmatic attacks.  The shortness of breath occurs mostly when she climbs steps.  She was seen by pulmonologist who said she has some scarring.  She also has significant dyslipidemia and has tried different statin but unable to tolerate secondary to myalgia.  Her last lab which was done in June showed normal TSH, blood sugar of 93.  A1c of 5.9.  Total cholesterol of 221 with the LDL of 141.  She quit smoking in 1970.  She does have exposure to cigarettes and vaping.  Her mother has COPD and hypercholesterolemia.    Past Surgical History:   Procedure Laterality Date   • CARDIOVASCULAR STRESS TEST  06/24/2022    4 Min. 7.00 METS. 91% THR. 215/74. EF 56%. Breast attenuation   • CONVERTED (HISTORICAL) HOLTER  02/23/2021    -11 Days. Avg 64. . 162 SVT   • ECHO - CONVERTED  06/15/2022    EF 65%. Trace-Mild MR & AI. RVSP- 27 mmHg   • GALLBLADDER SURGERY     • HYSTERECTOMY     • SPHENOIDECTOMY     • TONSILLECTOMY         Current Outpatient Medications   Medication Sig Dispense Refill   • albuterol (ProAir RespiClick) 108 (90 Base) MCG/ACT inhaler 1 puff Every 6 (Six) Hours As Needed.     • Asmanex, 30 Metered Doses, 220 MCG/ACT inhaler 1 puff As Needed.     • betamethasone dipropionate 0.05 % cream Apply 1 application  topically to the appropriate area as directed Daily.     • clonazePAM (KlonoPIN) 0.5 MG tablet Take 1 tablet by mouth At Night As Needed.     • cycloSPORINE (RESTASIS) 0.05 % ophthalmic emulsion      • famotidine (PEPCID) 20 MG tablet At Night As Needed.     • fluticasone (FLONASE) 50 MCG/ACT nasal spray As Needed.     • hydroCHLOROthiazide (HYDRODIURIL) 12.5 MG tablet Take 1 tablet by mouth Daily As Needed.     • levocetirizine (XYZAL) 5  MG tablet Take 1 tablet by mouth every night at bedtime. Take it every night     • levothyroxine (SYNTHROID, LEVOTHROID) 75 MCG tablet Take 1 tablet by mouth Daily.     • melatonin 5 MG tablet tablet Take 1 tablet by mouth At Night As Needed.     • montelukast (SINGULAIR) 10 MG tablet Take 1 tablet by mouth Every Night.     • multivitamin with minerals tablet tablet Take 1 tablet by mouth Daily.     • olmesartan (BENICAR) 20 MG tablet Take 1 tablet by mouth Daily As Needed (qhs). 90 tablet 3   • Omega-3 Fatty Acids (fish oil) 1000 MG capsule capsule Take  by mouth Daily With Breakfast.     • pantoprazole (PROTONIX) 40 MG EC tablet As Needed.     • triamcinolone (KENALOG) 0.1 % cream As Needed.       No current facility-administered medications for this visit.           ALLERGIES:  Atorvastatin, Crestor [rosuvastatin], Livalo [pitavastatin], Simvastatin, and Gentamicin    Past Medical History:   Diagnosis Date   • Anemia    • Anxiety    • Asthma    • Back problem    • Bronchitis    • Claustrophobia    • COVID-19 2021   • Depression    • Headache    • HL (hearing loss)    • Mononucleosis 2023   • Pre-diabetes    • Thyroid disease    • Tremors of nervous system        Social History     Tobacco Use   Smoking Status Former   • Types: Cigarettes   • Quit date:    • Years since quittin.9   • Passive exposure: Never   Smokeless Tobacco Never          Family History   Problem Relation Age of Onset   • COPD Mother    • Asthma Mother    • Arthritis Mother    • Hyperlipidemia Mother    • Heart attack Maternal Grandfather    • No Known Problems Son    • No Known Problems Son    • Seizures Daughter        Review of Systems   Constitutional: Positive for malaise/fatigue. Negative for decreased appetite.   HENT:  Negative for congestion and sore throat.    Eyes:  Negative for blurred vision, double vision and visual disturbance.   Cardiovascular:  Positive for chest pain, dyspnea on exertion and palpitations.  "  Respiratory:  Negative for shortness of breath and snoring.    Endocrine: Negative for cold intolerance and heat intolerance.   Hematologic/Lymphatic: Negative for adenopathy. Does not bruise/bleed easily.   Skin:  Negative for itching, nail changes and skin cancer.   Musculoskeletal:  Negative for arthritis and myalgias.   Gastrointestinal:  Negative for abdominal pain, dysphagia and heartburn.   Genitourinary:  Negative for bladder incontinence and frequency.   Neurological:  Positive for dizziness. Negative for seizures and vertigo.   Psychiatric/Behavioral:  Negative for altered mental status.    Allergic/Immunologic: Negative for environmental allergies and hives.     Diabetes- Yes  Thyroid- abnormal    Objective     /88 (BP Location: Left arm)   Pulse 54   Ht 160 cm (63\")   Wt 78.9 kg (174 lb)   BMI 30.82 kg/m²     Vitals and nursing note reviewed.   Constitutional:       Appearance: Healthy appearance. Not in distress.   Eyes:      Conjunctiva/sclera: Conjunctivae normal.      Pupils: Pupils are equal, round, and reactive to light.   HENT:      Head: Normocephalic.   Pulmonary:      Effort: Pulmonary effort is normal.      Breath sounds: Normal breath sounds.   Cardiovascular:      PMI at left midclavicular line. Bradycardia present. Regular rhythm.      Murmurs: There is a grade 3/6 high frequency blowing holosystolic murmur at the apex.   Abdominal:      General: Bowel sounds are normal.      Palpations: Abdomen is soft.   Musculoskeletal: Normal range of motion.      Cervical back: Normal range of motion and neck supple. Skin:     General: Skin is warm and dry.   Neurological:      Mental Status: Alert, oriented to person, place, and time and oriented to person, place and time.       ECG 12 Lead    Date/Time: 11/27/2023 4:28 PM  Performed by: Delmy Monk MD    Authorized by: Delmy Monk MD  Comparison: compared with previous ECG from 6/6/2023  Comparison to previous ECG: " Bradycardia  Rhythm: sinus bradycardia  Rate: bradycardic  Q waves: V3 and V4    QRS axis: normal    Clinical impression: abnormal EKG        @ASSESSMENT/PLAN@  BMI is >= 30 and <35. (Class 1 Obesity). The following options were offered after discussion;: weight loss educational material (shared in after visit summary), exercise counseling/recommendations, and nutrition counseling/recommendations     Diagnoses and all orders for this visit:    1. Primary hypertension (Primary)  -     olmesartan (BENICAR) 20 MG tablet; Take 1 tablet by mouth Daily As Needed (qhs).  Dispense: 90 tablet; Refill: 3    2. Hypercholesterolemia  -     Ambulatory Referral to Specialty Pharmacy    3. Shortness of breath    4. Hypothyroidism, unspecified type    5. Chronic fatigue  -     Overnight Sleep Oximetry Study; Future    6. Metabolic syndrome  -     Overnight Sleep Oximetry Study; Future    7. Hyperglycemia    At baseline she is mildly bradycardic.  Her blood pressure is elevated.  Her EKG showed sinus bradycardia with nonspecific ST changes.  Her clinical examination reveals a BMI of 31.  She does have a systolic murmur at the mitral area.    Regarding her hypertension, she takes multiple blood pressure check every day using a wrist monitor.  She has been to the emergency room twice since January for elevated blood pressure.  I had a long talk with her about the importance of taking Benicar regularly.  I advised her to start taking it every day in the evening for the next 1 week.  I like to recheck her blood pressure.  If it is still elevated then we can increase to 40 mg and can start on calcium channel blockers if needed.  I also advised her not to take the hydrochlorothiazide every day.    Regarding her hypercholesterolemia, she has tried multiple statins and unable to tolerate it.  I referred her to the specialty pharmacy to consider PCSK9 inhibitor.      Regarding her shortness of breath, it seems to be secondary to her lungs.   She is advised to talk with the pulmonologist regarding that    Regarding her chronic fatigue and metabolic syndrome, she needs to have sleep apnea ruled out.  I advised her to check her nocturnal pulse PO2 measurement.  If it is abnormal then she will be referred back to the pulmonologist    Regarding her hypothyroidism, she does take thyroid supplements and seems to be well controlled    Regarding her elevated A1c and borderline hyperglycemia, talked to her about diet and weight reduction.  I also gave her papers on Mediterranean diet    Regarding advanced directive, she does not have a living will or power of .  I gave her a booklet regarding it.    Based on her blood pressure response, further recommendations will be made.                     Electronically signed by Delmy Monk MD November 27, 2023 16:18 EST

## 2023-11-27 NOTE — LETTER
"November 27, 2023       No Recipients    Patient: Shelly Stevens   YOB: 1950   Date of Visit: 11/27/2023     Dear TIARA Vicente:       Thank you for referring Shelly Stevens to me for evaluation. Below are the relevant portions of my assessment and plan of care.    If you have questions, please do not hesitate to call me. I look forward to following Shelly along with you.         Sincerely,        Delmy Monk MD        CC:   No Recipients    Delmy Monk MD  11/27/23 6109  Sign when Signing Visit  Chief Complaint   Patient presents with   • Establish Care     PCP referred, chest pressure, HTN, SOB, palpitations. Pt had been followed by [facility] for the palpitations / bradycardia/ near syncope.  Reports having been seen here years ago.    • Chest Pain     \" A few little pains in chart, worse when my asthma is acting up\"   • Shortness of Breath     Worse with climbing stairs, reports Dr Donohue telling her that Covid had caused some scaring.    • Labs     PCP checked labs last in June, results in chart. Allergist checked other labs later.    • Hypertension     Reports having had Covid in July 2021 and mono throughout the year 2022. Feels her HTN this year is related to these. Up to 160/90's.  PT only takes olmesartan and HCTZ PRN ONLY due to it dropping her BP to low if she takes it daily. 90/60's   • statin intolerant     Asking about injections due to statin intolerants. She would like to do at the diagnostic center. She has a zetia script to start now but wanted to ask  you first.         CARDIAC COMPLAINTS  chest pressure/discomfort, dyspnea, fatigue, and palpitations      Subjective  Shelly Stevens is a 72 y.o. female came in today for her initial dental cardiology evaluation.  She has history of hypertension, hypothyroidism, hypercholesterolemia and hyperglycemia.  She has been followed by an electrophysiologist for palpitation and dizziness along with near syncope.  Her " echocardiogram showed normal LV systolic function and trace to mild MR and AI.  Her stress test showed hypertensive blood pressure response.  She also wore a monitor which apparently showed some SVT.  It was felt that she may have vasovagal episodes and because of the bradycardia beta-blockers were not added.  She was put on low-dose of hydrochlorothiazide as well as an ARB.  She does not take the ARB regularly since she takes blood pressure at least 5-6 times a day and worried that she may drop her blood pressure.  Only if the systolic is more than 160 then she takes 20 mg of Benicar.  She apparently had COVID infection 2 years ago after which she has been noticing chest pain, shortness of breath and palpitation.  The chest pain is mostly when she takes a deep breath especially if she has asthmatic attacks.  The shortness of breath occurs mostly when she climbs steps.  She was seen by pulmonologist who said she has some scarring.  She also has significant dyslipidemia and has tried different statin but unable to tolerate secondary to myalgia.  Her last lab which was done in June showed normal TSH, blood sugar of 93.  A1c of 5.9.  Total cholesterol of 221 with the LDL of 141.  She quit smoking in 1970.  She does have exposure to cigarettes and vaping.  Her mother has COPD and hypercholesterolemia.    Past Surgical History:   Procedure Laterality Date   • CARDIOVASCULAR STRESS TEST  06/24/2022    4 Min. 7.00 METS. 91% THR. 215/74. EF 56%. Breast attenuation   • CONVERTED (HISTORICAL) HOLTER  02/23/2021    -11 Days. Avg 64. . 162 SVT   • ECHO - CONVERTED  06/15/2022    EF 65%. Trace-Mild MR & AI. RVSP- 27 mmHg   • GALLBLADDER SURGERY     • HYSTERECTOMY     • SPHENOIDECTOMY     • TONSILLECTOMY         Current Outpatient Medications   Medication Sig Dispense Refill   • albuterol (ProAir RespiClick) 108 (90 Base) MCG/ACT inhaler 1 puff Every 6 (Six) Hours As Needed.     • Asmanex, 30 Metered Doses, 220  MCG/ACT inhaler 1 puff As Needed.     • betamethasone dipropionate 0.05 % cream Apply 1 application  topically to the appropriate area as directed Daily.     • clonazePAM (KlonoPIN) 0.5 MG tablet Take 1 tablet by mouth At Night As Needed.     • cycloSPORINE (RESTASIS) 0.05 % ophthalmic emulsion      • famotidine (PEPCID) 20 MG tablet At Night As Needed.     • fluticasone (FLONASE) 50 MCG/ACT nasal spray As Needed.     • hydroCHLOROthiazide (HYDRODIURIL) 12.5 MG tablet Take 1 tablet by mouth Daily As Needed.     • levocetirizine (XYZAL) 5 MG tablet Take 1 tablet by mouth every night at bedtime. Take it every night     • levothyroxine (SYNTHROID, LEVOTHROID) 75 MCG tablet Take 1 tablet by mouth Daily.     • melatonin 5 MG tablet tablet Take 1 tablet by mouth At Night As Needed.     • montelukast (SINGULAIR) 10 MG tablet Take 1 tablet by mouth Every Night.     • multivitamin with minerals tablet tablet Take 1 tablet by mouth Daily.     • olmesartan (BENICAR) 20 MG tablet Take 1 tablet by mouth Daily As Needed (qhs). 90 tablet 3   • Omega-3 Fatty Acids (fish oil) 1000 MG capsule capsule Take  by mouth Daily With Breakfast.     • pantoprazole (PROTONIX) 40 MG EC tablet As Needed.     • triamcinolone (KENALOG) 0.1 % cream As Needed.       No current facility-administered medications for this visit.           ALLERGIES:  Atorvastatin, Crestor [rosuvastatin], Livalo [pitavastatin], Simvastatin, and Gentamicin    Past Medical History:   Diagnosis Date   • Anemia    • Anxiety    • Asthma    • Back problem    • Bronchitis    • Claustrophobia    • COVID-19 2021   • Depression    • Headache    • HL (hearing loss)    • Mononucleosis 2023   • Pre-diabetes    • Thyroid disease    • Tremors of nervous system        Social History     Tobacco Use   Smoking Status Former   • Types: Cigarettes   • Quit date:    • Years since quittin.9   • Passive exposure: Never   Smokeless Tobacco Never          Family History  "  Problem Relation Age of Onset   • COPD Mother    • Asthma Mother    • Arthritis Mother    • Hyperlipidemia Mother    • Heart attack Maternal Grandfather    • No Known Problems Son    • No Known Problems Son    • Seizures Daughter        Review of Systems   Constitutional: Positive for malaise/fatigue. Negative for decreased appetite.   HENT:  Negative for congestion and sore throat.    Eyes:  Negative for blurred vision, double vision and visual disturbance.   Cardiovascular:  Positive for chest pain, dyspnea on exertion and palpitations.   Respiratory:  Negative for shortness of breath and snoring.    Endocrine: Negative for cold intolerance and heat intolerance.   Hematologic/Lymphatic: Negative for adenopathy. Does not bruise/bleed easily.   Skin:  Negative for itching, nail changes and skin cancer.   Musculoskeletal:  Negative for arthritis and myalgias.   Gastrointestinal:  Negative for abdominal pain, dysphagia and heartburn.   Genitourinary:  Negative for bladder incontinence and frequency.   Neurological:  Positive for dizziness. Negative for seizures and vertigo.   Psychiatric/Behavioral:  Negative for altered mental status.    Allergic/Immunologic: Negative for environmental allergies and hives.     Diabetes- Yes  Thyroid- abnormal    Objective    /88 (BP Location: Left arm)   Pulse 54   Ht 160 cm (63\")   Wt 78.9 kg (174 lb)   BMI 30.82 kg/m²     Vitals and nursing note reviewed.   Constitutional:       Appearance: Healthy appearance. Not in distress.   Eyes:      Conjunctiva/sclera: Conjunctivae normal.      Pupils: Pupils are equal, round, and reactive to light.   HENT:      Head: Normocephalic.   Pulmonary:      Effort: Pulmonary effort is normal.      Breath sounds: Normal breath sounds.   Cardiovascular:      PMI at left midclavicular line. Bradycardia present. Regular rhythm.      Murmurs: There is a grade 3/6 high frequency blowing holosystolic murmur at the apex.   Abdominal:      " General: Bowel sounds are normal.      Palpations: Abdomen is soft.   Musculoskeletal: Normal range of motion.      Cervical back: Normal range of motion and neck supple. Skin:     General: Skin is warm and dry.   Neurological:      Mental Status: Alert, oriented to person, place, and time and oriented to person, place and time.       ECG 12 Lead    Date/Time: 11/27/2023 4:28 PM  Performed by: Delmy Monk MD    Authorized by: Delmy Monk MD  Comparison: compared with previous ECG from 6/6/2023  Comparison to previous ECG: Bradycardia  Rhythm: sinus bradycardia  Rate: bradycardic  Q waves: V3 and V4    QRS axis: normal    Clinical impression: abnormal EKG        @ASSESSMENT/PLAN@  BMI is >= 30 and <35. (Class 1 Obesity). The following options were offered after discussion;: weight loss educational material (shared in after visit summary), exercise counseling/recommendations, and nutrition counseling/recommendations     Diagnoses and all orders for this visit:    1. Primary hypertension (Primary)  -     olmesartan (BENICAR) 20 MG tablet; Take 1 tablet by mouth Daily As Needed (qhs).  Dispense: 90 tablet; Refill: 3    2. Hypercholesterolemia  -     Ambulatory Referral to Specialty Pharmacy    3. Shortness of breath    4. Hypothyroidism, unspecified type    5. Chronic fatigue  -     Overnight Sleep Oximetry Study; Future    6. Metabolic syndrome  -     Overnight Sleep Oximetry Study; Future    7. Hyperglycemia    At baseline she is mildly bradycardic.  Her blood pressure is elevated.  Her EKG showed sinus bradycardia with nonspecific ST changes.  Her clinical examination reveals a BMI of 31.  She does have a systolic murmur at the mitral area.    Regarding her hypertension, she takes multiple blood pressure check every day using a wrist monitor.  She has been to the emergency room twice since January for elevated blood pressure.  I had a long talk with her about the importance of taking Benicar  regularly.  I advised her to start taking it every day in the evening for the next 1 week.  I like to recheck her blood pressure.  If it is still elevated then we can increase to 40 mg and can start on calcium channel blockers if needed.  I also advised her not to take the hydrochlorothiazide every day.    Regarding her hypercholesterolemia, she has tried multiple statins and unable to tolerate it.  I referred her to the specialty pharmacy to consider PCSK9 inhibitor.      Regarding her shortness of breath, it seems to be secondary to her lungs.  She is advised to talk with the pulmonologist regarding that    Regarding her chronic fatigue and metabolic syndrome, she needs to have sleep apnea ruled out.  I advised her to check her nocturnal pulse PO2 measurement.  If it is abnormal then she will be referred back to the pulmonologist    Regarding her hypothyroidism, she does take thyroid supplements and seems to be well controlled    Regarding her elevated A1c and borderline hyperglycemia, talked to her about diet and weight reduction.  I also gave her papers on Mediterranean diet    Regarding advanced directive, she does not have a living will or power of .  I gave her a booklet regarding it.    Based on her blood pressure response, further recommendations will be made.                     Electronically signed by Delmy Monk MD November 27, 2023 16:18 EST

## 2023-11-28 PROBLEM — M54.42 CHRONIC BILATERAL LOW BACK PAIN WITH BILATERAL SCIATICA: Status: ACTIVE | Noted: 2023-04-13

## 2023-11-28 PROBLEM — M54.41 CHRONIC BILATERAL LOW BACK PAIN WITH BILATERAL SCIATICA: Status: ACTIVE | Noted: 2023-04-13

## 2023-11-28 PROBLEM — G89.29 CHRONIC BILATERAL LOW BACK PAIN WITH BILATERAL SCIATICA: Status: ACTIVE | Noted: 2023-04-13

## 2023-11-30 ENCOUNTER — TELEPHONE (OUTPATIENT)
Dept: CARDIOLOGY | Facility: CLINIC | Age: 73
End: 2023-11-30
Payer: MEDICARE

## 2023-11-30 NOTE — TELEPHONE ENCOUNTER
Attempted to contact patient to see if they would like to do the pulse ox that was ordered at her office visit at [facility] in [facility].

## 2023-12-01 ENCOUNTER — TELEPHONE (OUTPATIENT)
Dept: CARDIOLOGY | Facility: CLINIC | Age: 73
End: 2023-12-01
Payer: MEDICARE

## 2023-12-01 NOTE — TELEPHONE ENCOUNTER
Caller: Shelly Stevens    Relationship: Self    Best call back number: [phone] OK TO LEAVE VM    What is the best time to reach you: NEXT HOUR OR SO     Who are you requesting to speak with (clinical staff, provider,  specific staff member):     Do you know the name of the person who called: JERAD     What was the call regarding: PATIENT IS CALLING BACK FROM MISSED CALL FROM JERAD PLEASE REACH BACK TO PATIENT    Is it okay if the provider responds through MyChart: PREFER CALL

## 2023-12-04 ENCOUNTER — TELEPHONE (OUTPATIENT)
Dept: CARDIOLOGY | Facility: CLINIC | Age: 73
End: 2023-12-04

## 2023-12-04 ENCOUNTER — CLINICAL SUPPORT (OUTPATIENT)
Dept: CARDIOLOGY | Facility: CLINIC | Age: 73
End: 2023-12-04
Payer: MEDICARE

## 2023-12-04 VITALS — DIASTOLIC BLOOD PRESSURE: 64 MMHG | HEART RATE: 67 BPM | SYSTOLIC BLOOD PRESSURE: 118 MMHG

## 2023-12-04 DIAGNOSIS — Z01.30 BLOOD PRESSURE CHECK: Primary | ICD-10-CM

## 2023-12-04 NOTE — TELEPHONE ENCOUNTER
Patient made aware of recommendations. Patient would like to do 20 mg in morning and 20 mg in evening with Olmasartan. Patient would like to hold off on the HCTZ 25 mg since patient stated that her blood pressure could get too low with the added dose since 12.5 mg HCTZ helps drop her blood pressure fast. Patient has enough meds for the increase but will call if she has any issues with her blood pressure or if they will need refills.

## 2023-12-04 NOTE — TELEPHONE ENCOUNTER
Patient came into office for blood pressure check. Per office visit her blood pressure was 118/64 with Heart rate: 67. Patient had taken olmesartan 40 mg in total on 12/02/2023 with 1/2 tablet of HCTZ 12.5 mg and on 12/03/2023 she took another 1/2 tablet of HCTZ with 20 mg of olmesartan.  She went to the hospital on 12/02/2023 for high blood pressure. At hospital her blood pressure was 135/86.

## 2023-12-12 ENCOUNTER — TELEPHONE (OUTPATIENT)
Dept: CARDIOLOGY | Facility: CLINIC | Age: 73
End: 2023-12-12
Payer: MEDICARE

## 2023-12-12 NOTE — TELEPHONE ENCOUNTER
Patient called needing number to call with question for the lipid clinic. She was given [phone].

## 2024-01-02 ENCOUNTER — TELEPHONE (OUTPATIENT)
Dept: CARDIOLOGY | Facility: CLINIC | Age: 74
End: 2024-01-02
Payer: MEDICARE

## 2024-01-02 NOTE — TELEPHONE ENCOUNTER
Caller: Shelly Stevens    Relationship: Self    Best call back number: [phone]    What is the best time to reach you: ANY    Who are you requesting to speak with (clinical staff, provider,  specific staff member): CLINICAL       What was the call regarding: PATIENT CALLED TO ADVISE THAT SHE HAS BEEN HAVING BLOOD PRESSURE ISSUES, WITH A HEADACHE, NAUSEA, AND WEAKNESS SINCE 12-31-23. SHE ADVISES HER BLOOD PRESSURE HAS CAME BACK DOWN LOWER THAN NORMAL TODAY, BUT SHE IS STILL EXPERIENCING A HEADACHE. PLEASE CONTACT PATIENT AND ADVISE HOW YOU WOULD LIKE TO PROCEED.    Is it okay if the provider responds through [facility]: PLEASE CALL

## 2024-01-02 NOTE — TELEPHONE ENCOUNTER
Spoke at length with patient. She had had a very high sodium diet the day that her BP went up. She very rarely ever takes the HCTZ. She would like to try a low sodium diet and taking her medication more routine that she has been, will call back if still having problems. States her BP is actually low today and headache is better than it was.    No

## 2024-01-31 ENCOUNTER — TELEPHONE (OUTPATIENT)
Dept: CARDIOLOGY | Facility: CLINIC | Age: 74
End: 2024-01-31
Payer: MEDICARE

## 2024-01-31 NOTE — TELEPHONE ENCOUNTER
Called patient, spoke at length with her. States her BP had been fluctuating. Woke last night anxious.BPelevated, states she took 1/2 doses through the night of the HCTZ and Olmesartan.  BP had been elevated and she would take another 1/2 tab of one or the other. BP now 120's/70's after taking whole doses of both. Does admit to high sodium diet a lot, has been trying to work on that. Afraid to take the olmesartan and HCTZ daily afraid it will drop her BP to low. She is going to try taking the HCTZ 12.5 mg daily for a few days and see if it is more consistent. Will continue to monitor and call if still having problems.

## 2024-01-31 NOTE — TELEPHONE ENCOUNTER
Patient called stating that her blood pressure is going up and down without being able to sleep due to it. She stated that sometimes when she does take her blood pressure medications it can drop too low.

## 2024-02-12 ENCOUNTER — TELEPHONE (OUTPATIENT)
Dept: CARDIOLOGY | Facility: CLINIC | Age: 74
End: 2024-02-12
Payer: MEDICARE

## 2024-02-26 ENCOUNTER — TELEPHONE (OUTPATIENT)
Dept: CARDIOLOGY | Facility: CLINIC | Age: 74
End: 2024-02-26
Payer: MEDICARE

## 2024-02-26 NOTE — TELEPHONE ENCOUNTER
Need all the complaints at one time  Difficult to manage multiple complaints at different times  Let me know if there are any other complaints

## 2024-02-26 NOTE — TELEPHONE ENCOUNTER
Caller: Shelly Stevens    Relationship: Self    Best call back number: [phone]    What is the best time to reach you: ANYTIME    What was the call regarding: PT WOULD LIKE GET INTO SEE DR. LANRE KAN DUE TO OLMESARTEN. PT HAVING HAVING SOB AND HEART IS RACING ALONG WITH HER TONGUE SWELLING AND HARD TO SWALLOW AND TALK. ALSO HAVING A JERKING REACTION UNSURE IF THIS IS RELATED TO MEDICATION ALSO. CAN COME IN TODAY OR ANY DAY THIS WEEK JUST NOT 2/27/24 AND WOULD LIKE TO DISCUSS. PLEASE CALL BACK TO ADVISE, THANK YOU.

## 2024-02-26 NOTE — TELEPHONE ENCOUNTER
Pt aware to stop the olmesartan and start Norvasc 5 mg daily PRN. She said she forgot to tell me but wanted me to be sure and let you know that her heart races at night sometimes and makes her really SOB.

## 2024-02-26 NOTE — TELEPHONE ENCOUNTER
Spoke at length with pt. She has had the problems noted, tongue swelling, dysphagia, sore throat for several months. She has had numerous tests at the ENT office, was given prednisone, GI did scope, was told she had yeast in throat and esophagus, was given a script for Diflucan a week ago ( has a two week script). Reports sore throat improving but other symptoms still there, also having jerking movements in her body at times. She said that she is sure it is the olmesartan, that she read it could cause swelling in your tongue. Asking if it  to be changed. She is only taking it about every other day when BP is elevated. .       Current meds include:    Olemasartan 20 mg daily PRN  HCTZ 12.5 mg daily PRN

## 2024-02-27 RX ORDER — AMLODIPINE BESYLATE 5 MG/1
5 TABLET ORAL DAILY PRN
Qty: 90 TABLET | Refills: 1 | Status: SHIPPED | OUTPATIENT
Start: 2024-02-27

## 2024-02-27 NOTE — TELEPHONE ENCOUNTER
Pt states no other complaints that she can think of other than the ones noted here. Ok to go ahead with amlodipine script or did you want to do something different?

## 2024-03-18 ENCOUNTER — OFFICE VISIT (OUTPATIENT)
Dept: ORTHOPEDIC SURGERY | Facility: CLINIC | Age: 74
End: 2024-03-18
Payer: MEDICARE

## 2024-03-18 VITALS
SYSTOLIC BLOOD PRESSURE: 166 MMHG | WEIGHT: 167.4 LBS | BODY MASS INDEX: 29.66 KG/M2 | HEIGHT: 63 IN | DIASTOLIC BLOOD PRESSURE: 110 MMHG

## 2024-03-18 DIAGNOSIS — M25.551 BILATERAL HIP PAIN: Primary | ICD-10-CM

## 2024-03-18 DIAGNOSIS — M25.552 BILATERAL HIP PAIN: Primary | ICD-10-CM

## 2024-03-18 PROCEDURE — 1160F RVW MEDS BY RX/DR IN RCRD: CPT | Performed by: ORTHOPAEDIC SURGERY

## 2024-03-18 PROCEDURE — 3080F DIAST BP >= 90 MM HG: CPT | Performed by: ORTHOPAEDIC SURGERY

## 2024-03-18 PROCEDURE — 1159F MED LIST DOCD IN RCRD: CPT | Performed by: ORTHOPAEDIC SURGERY

## 2024-03-18 PROCEDURE — 3077F SYST BP >= 140 MM HG: CPT | Performed by: ORTHOPAEDIC SURGERY

## 2024-03-18 PROCEDURE — 99204 OFFICE O/P NEW MOD 45 MIN: CPT | Performed by: ORTHOPAEDIC SURGERY

## 2024-03-18 RX ORDER — DEXLANSOPRAZOLE 60 MG/1
CAPSULE, DELAYED RELEASE ORAL
COMMUNITY
Start: 2024-02-16

## 2024-03-18 NOTE — PROGRESS NOTES
[facility] Note    Subjective     Chief Complaint   Patient presents with   • Right Hip - Pain   • Left Hip - Pain        HPI    Shelly Stevens is a 73 y.o. female who presents with new problem of: Bilateral hip pain.  Onset: atraumatic and gradual in nature. The issue has been ongoing for 2 month(s). Pain is a 4-7/10 on the pain scale. Pain is described as aching, throbbing, and shooting. Associated symptoms include pain, stiffness, and giving way/buckling. The pain is worse with walking, standing, sitting, climbing stairs, sleeping, working, leisure, lying on affected side, rising from seated position, and any movement of the joint; ice, pain medication and/or NSAID, and lying down improve the pain. Previous treatments have included: NSAIDS.  She also has back issues, and has seen Dr. Man for spinal stenosis.  Relief with the use of Tylenol and naproxen when she uses it.    I have reviewed the following portions of the patient's history and agree with: History of Present Illness and Review of Systems    Patient Active Problem List   Diagnosis   • Spinal stenosis of lumbar region with neurogenic claudication L3-4    • Palpitations   • Vasovagal syncope   • Class 1 obesity due to excess calories with serious comorbidity and body mass index (BMI) of 31.0 to 31.9 in adult   • Fibromyalgia   • Hyperglycemia   • Metabolic syndrome   • Chronic fatigue   • Hypothyroidism   • Shortness of breath   • Hypercholesterolemia   • Primary hypertension   • Chronic bilateral low back pain with bilateral sciatica     Past Medical History:   Diagnosis Date   • Anemia    • Anxiety    • Asthma    • Back problem    • Bronchitis    • Claustrophobia    • COVID-19 07/2021   • Depression    • Headache    • HL (hearing loss)    • Mononucleosis 05/29/2023   • Pre-diabetes    • Thyroid disease    • Tremors of nervous system       Past Surgical History:   Procedure Laterality Date   • CARDIOVASCULAR STRESS TEST   2022    4 Min. 7.00 METS. 91% THR. 215/74. EF 56%. Breast attenuation   • CONVERTED (HISTORICAL) HOLTER  2021    -11 Days. Avg 64. . 162 SVT   • ECHO - CONVERTED  06/15/2022    EF 65%. Trace-Mild MR & AI. RVSP- 27 mmHg   • GALLBLADDER SURGERY     • HYSTERECTOMY     • OTHER SURGICAL HISTORY  2023    Pulse O2- Borderline   • SPHENOIDECTOMY     • TONSILLECTOMY        Family History   Problem Relation Age of Onset   • COPD Mother    • Asthma Mother    • Arthritis Mother    • Hyperlipidemia Mother    • Heart attack Maternal Grandfather    • No Known Problems Son    • No Known Problems Son    • Seizures Daughter      Social History     Socioeconomic History   • Marital status:    Tobacco Use   • Smoking status: Former     Current packs/day: 0.00     Types: Cigarettes     Quit date:      Years since quittin.2     Passive exposure: Never   • Smokeless tobacco: Never   Vaping Use   • Vaping status: Never Used   • Passive vaping exposure: Yes   Substance and Sexual Activity   • Alcohol use: Never   • Drug use: Never   • Sexual activity: Defer      Current Outpatient Medications on File Prior to Visit   Medication Sig Dispense Refill   • albuterol (ProAir RespiClick) 108 (90 Base) MCG/ACT inhaler 1 puff Every 6 (Six) Hours As Needed.     • amLODIPine (NORVASC) 5 MG tablet Take 1 tablet by mouth Daily As Needed (for elevated BP). 90 tablet 1   • Asmanex, 30 Metered Doses, 220 MCG/ACT inhaler 1 puff As Needed.     • betamethasone dipropionate 0.05 % cream Apply 1 Application topically to the appropriate area as directed Daily.     • clonazePAM (KlonoPIN) 0.5 MG tablet Take 1 tablet by mouth At Night As Needed.     • cycloSPORINE (RESTASIS) 0.05 % ophthalmic emulsion      • dexlansoprazole (DEXILANT) 60 MG capsule      • famotidine (PEPCID) 20 MG tablet At Night As Needed.     • fluticasone (FLONASE) 50 MCG/ACT nasal spray As Needed.     • hydroCHLOROthiazide (HYDRODIURIL) 12.5  "MG tablet Take 1 tablet by mouth Daily As Needed.     • levocetirizine (XYZAL) 5 MG tablet Take 1 tablet by mouth every night at bedtime. Take it every night     • levothyroxine (SYNTHROID, LEVOTHROID) 75 MCG tablet Take 1 tablet by mouth Daily.     • melatonin 5 MG tablet tablet Take 1 tablet by mouth At Night As Needed.     • montelukast (SINGULAIR) 10 MG tablet Take 1 tablet by mouth Every Night.     • multivitamin with minerals tablet tablet Take 1 tablet by mouth Daily.     • olmesartan (BENICAR) 20 MG tablet Take 1 tablet by mouth Daily As Needed (qhs). 90 tablet 3   • Omega-3 Fatty Acids (fish oil) 1000 MG capsule capsule Take  by mouth Daily With Breakfast.     • pantoprazole (PROTONIX) 40 MG EC tablet As Needed.     • terconazole (TERAZOL 3) 0.8 % vaginal cream      • triamcinolone (KENALOG) 0.1 % cream As Needed.       No current facility-administered medications on file prior to visit.      Allergies   Allergen Reactions   • Atorvastatin Myalgia   • Crestor [Rosuvastatin] Myalgia   • Livalo [Pitavastatin] Myalgia   • Simvastatin Myalgia   • Gentamicin Palpitations     Other reaction(s): VOMITING        Review of Systems   Constitutional: Negative.    HENT: Negative.     Eyes: Negative.    Respiratory: Negative.     Cardiovascular: Negative.    Gastrointestinal: Negative.    Endocrine: Negative.    Genitourinary: Negative.    Musculoskeletal:  Positive for arthralgias.   Skin: Negative.    Allergic/Immunologic: Negative.    Neurological: Negative.    Hematological: Negative.    Psychiatric/Behavioral: Negative.          Objective      Physical Exam  BP (!) 166/110   Ht 160 cm (63\")   Wt 75.9 kg (167 lb 6.4 oz)   BMI 29.65 kg/m²     Body mass index is 29.65 kg/m².    General:   Mental Status:  Alert   Appearance: Cooperative, in no acute distress   Build and Nutrition: Overweight by BMI female   Orientation: Alert and oriented to person, place and time   Posture: Normal   Gait: " Nonantalgic/normal    Integument:   Right hip: No skin lesions, no rash, no ecchymosis   Left hip: No skin lesions, no rash, no ecchymosis    Neurologic:   Motor:  Right lower extremity: 5/5 quadriceps, hamstrings, ankle dorsiflexors, and ankle plantar flexors  Left lower extremity: 5/5 quadriceps, hamstrings, ankle dorsiflexors, and ankle plantar flexors    Lower Extremities:   Right Hip:    Tenderness:  None    Swelling:  None    Crepitus: None    Atrophy:  None    Range of motion:  External Rotation: 30°       Internal Rotation: 30°       Flexion:  100°       Extension:  0°   Instability:  None  Deformities:  None  Functional testing: Negative Stinchfield  No leg length discrepancy   Left Hip:    Tenderness:  None    Swelling:  None    Crepitus:  None    Atrophy:  None    Range of motion:  External Rotation: 30°       Internal Rotation: 30°       Flexion:  100°       Extension:  0°   Instability:  None  Deformities:  None  Functional testing: Negative Stinchfield    No leg length discrepancy      Imaging/Studies      Imaging Results (Last 24 Hours)       Procedure Component Value Units Date/Time    XR Hips Bilateral With or Without Pelvis 3-4 View [[id number]] Resulted: 03/18/24 1508     Updated: 03/18/24 1509    Narrative:      Right Hip Radiographs  Indication: right hip pain  Views: low AP pelvis and lateral of the right hip    Comparison: no prior studies available for review    Findings:   No acute or chronic bony abnormalities with normal alignment.       Left Hip Radiographs  Indication: left hip pain  Views: low AP pelvis and lateral of the left hip    Comparison: no prior studies available for review    Findings:   No acute or chronic bony abnormalities with normal alignment.          MRI of both of her hips from 12/11/2023 at [facility] showed mild degenerative changes in both hips, possible degeneration of the right labrum and gluteus medius and minimus tendinosis.    Assessment  and Plan     Diagnoses and all orders for this visit:    1. Bilateral hip pain (Primary)  -     XR Hips Bilateral With or Without Pelvis 3-4 View  -     Ambulatory Referral to Physical Therapy Evaluate and treat, Ortho        1. Bilateral hip pain        I reviewed my findings with the patient.  Although she does have some mild degenerative changes noted on her MRI, I do not believe that her hips are her main pain generator.  Most of her pain is likely coming from her back.  However, we will try a course of some physical therapy for her hips.  I be happy to see her back in the future if it more localizes to her hips in the future.    Return if symptoms worsen or fail to improve.      Car Cesar MD  03/18/24  15:55 EDT    Dictated Utilizing Dragon Dictation

## 2024-03-29 ENCOUNTER — TELEPHONE (OUTPATIENT)
Dept: CARDIOLOGY | Facility: CLINIC | Age: 74
End: 2024-03-29
Payer: MEDICARE

## 2024-03-29 NOTE — TELEPHONE ENCOUNTER
Spoke with patient, made them aware that since she was having chest pains, that she needed to go to the ER. Also relayed that Aleshia has been out of office and we will not be in next week making their schedule strained for earlier appointments especially with Dr. Monk. Also relayed that we have a half day today so we won't be in office for long. Advised patient to call for cancellations and to go to ER. Patient stated that they will try to get in with family doctor today for an EKG and blood pressure check since patient stated they were having issues with their blood pressure being too low and not taking their blood pressure medications everyday due to this issue. Patient will call us back with update on what their family doctor or ER says and so that we can get records. Patient replied in understanding.

## 2024-03-29 NOTE — TELEPHONE ENCOUNTER
Caller: Shelly Stevens    Relationship to patient: Self    Best call back number: [phone]    Chief complaint:     Type of visit: FOLLOW UP    Requested date: ASAP     If rescheduling, when is the original appointment: 6.5.2024     Additional notes:PATIENT ADVISING SHE HAS BEEN EXPERIENCING QUICK CHEST PAINS FOR THE LAST 2 WEEKS OFF AND ON. SHE SAID HER ASTHMA CAUSES THAT AT TIMES BUT SHE WOULD LIKE TO GET IN TO GET AN EKG AT LEAST. PLEASE CALL THE PATIENT ASAP TO RESCHEDULE/DISCUSS.

## 2024-03-29 NOTE — TELEPHONE ENCOUNTER
Hub staff attempted to follow warm transfer process and was unsuccessful     Caller: Shelly Stevens    Relationship to patient: Self    Best call back number: [phone]    Patient is needing: PATIENT HAS BEEN HAVING CHEST PAINS. DID ASK IF THE ER WAS NEEDED, SHE SAID NO. PLEASE CALL THE PATIENT ASAP.

## 2024-04-30 ENCOUNTER — TELEPHONE (OUTPATIENT)
Dept: CARDIOLOGY | Facility: CLINIC | Age: 74
End: 2024-04-30
Payer: MEDICARE

## 2024-04-30 NOTE — TELEPHONE ENCOUNTER
Patient called stating that she has been having chest pain, pain in limbs, gets short of breath easily. Patient has been to the ER on 04/25/24. Patient was requesting sooner appointment. Patient is scheduled for tomorrow at 1:45 pm with you. I have pulled the ER records and we have most recent office note and labs from PCP along with records from Dr. Vora office.

## 2024-05-01 ENCOUNTER — OFFICE VISIT (OUTPATIENT)
Dept: CARDIOLOGY | Facility: CLINIC | Age: 74
End: 2024-05-01
Payer: MEDICARE

## 2024-05-01 VITALS
WEIGHT: 170.8 LBS | SYSTOLIC BLOOD PRESSURE: 144 MMHG | DIASTOLIC BLOOD PRESSURE: 80 MMHG | BODY MASS INDEX: 30.26 KG/M2 | HEART RATE: 68 BPM | HEIGHT: 63 IN

## 2024-05-01 DIAGNOSIS — E78.00 HYPERCHOLESTEROLEMIA: Primary | ICD-10-CM

## 2024-05-01 DIAGNOSIS — R53.82 CHRONIC FATIGUE: ICD-10-CM

## 2024-05-01 DIAGNOSIS — R07.89 CHEST PAIN, ATYPICAL: ICD-10-CM

## 2024-05-01 DIAGNOSIS — I10 PRIMARY HYPERTENSION: ICD-10-CM

## 2024-05-01 DIAGNOSIS — E03.9 HYPOTHYROIDISM, UNSPECIFIED TYPE: ICD-10-CM

## 2024-05-01 DIAGNOSIS — R06.02 SHORTNESS OF BREATH: ICD-10-CM

## 2024-05-01 RX ORDER — TRIAMCINOLONE ACETONIDE 55 UG/1
2 SPRAY, METERED NASAL DAILY PRN
COMMUNITY

## 2024-05-01 NOTE — PROGRESS NOTES
"Chief Complaint   Patient presents with    Follow-up     Cardiac management    Lab     Last labs at [facility]. She went to [facility] 4/25/24 for chest pain and shortness of air.    Chest Pain     Started having 2-3 months ago. For the last couple weeks having shooting pain that goes across chest, short in duration.     Shortness of Breath     Has had since having covid in 2022. Having with minimal exertion.    Rapid Heart Rate     States \"I can just walk to the bathroom and heart races then I become short of breath\". Reports when heart is racing she feels like she is going to \"pass out\".    Blood pressure     Has been fluctuating, she is monitoring at home and will not take medication if BP below 130/80. She did not start amlodipine and no longer taking HCTZ.    Medication     She has concerns with wanting possibly start injection for cholesterol.    Arm pain     Having issues with pain in arms and wrist. She has appointment with rheumatology in [place].    Edema     Having swelling in ankles.    Med Refill     Does not need refills.     Subjective       Shelly Stevens is a 73 y.o. female with HTN, hypothyroidism, hypercholesterolemia and hyperglycemia.   She was referred for cardiac evaluation in November 2023.  She was evaluated by EP in the past for palpitations and near syncope found to have SVT.  Echocardiogram showed normal LVEF.  Also felt she had vasovagal episodes and beta-blocker not added.  HCTZ and ARB started.  She takes as needed secondary to frequent hypotension.  Statin has not been tolerated and lipids remain elevated at .  At consult, she was advised to take olmesartan daily.  She then developed heart racing, tongue swelling, sore throat she attributed to olmesartan.  It was discontinued and amlodipine 5 mg as needed recommended.    She presents today for follow up. She did not take amlodipine, still using olmesartan PRN. EGD showed yeast in throat. She has multiple concerns today. BP continues to " "fluctuate. 90% readings are normal. Few elevated readings 150/90's. She uses olmesartan 10 mg PRN. She brought in a list of symptoms she is experiencing; \"sweating in the am, can't handle stress, shortness of breath with cough, hoarseness, wheezing, loss of voice, get winded talking on phone, heart palpitations, almost fainting, loss of appetite and nausea, stomach hurts, constipation, abdominal pain, bloating, skin and muscle sensations, anxiety, dilated pupils, don't smell as well, change in taste, change in urine output, walking/pulling sensation to the left.\" She has done some research and feels she has vagus nerve dysfunction. She is now interested in PCSK9.        Cardiac History:    Past Surgical History:   Procedure Laterality Date    CARDIOVASCULAR STRESS TEST  06/24/2022    4 Min. 7.00 METS. 91% THR. 215/74. EF 56%. Breast attenuation    CONVERTED (HISTORICAL) HOLTER  02/23/2021    -11 Days. Avg 64. . 162 SVT    ECHO - CONVERTED  06/15/2022    EF 65%. Trace-Mild MR & AI. RVSP- 27 mmHg    GALLBLADDER SURGERY      HYSTERECTOMY      OTHER SURGICAL HISTORY  11/30/2023    Pulse O2- Borderline    SPHENOIDECTOMY      TONSILLECTOMY       Current Outpatient Medications   Medication Sig Dispense Refill    albuterol (ProAir RespiClick) 108 (90 Base) MCG/ACT inhaler 1 puff Every 6 (Six) Hours As Needed.      Asmanex, 30 Metered Doses, 220 MCG/ACT inhaler 1 puff As Needed.      cycloSPORINE (RESTASIS) 0.05 % ophthalmic emulsion       dexlansoprazole (DEXILANT) 60 MG capsule Daily As Needed.      famotidine (PEPCID) 20 MG tablet At Night As Needed.      hydroCHLOROthiazide (HYDRODIURIL) 12.5 MG tablet Take 1 tablet by mouth Daily As Needed.      levothyroxine (SYNTHROID, LEVOTHROID) 75 MCG tablet Take 1 tablet by mouth Daily.      montelukast (SINGULAIR) 10 MG tablet Take 1 tablet by mouth Every Night.      multivitamin with minerals tablet tablet Take 1 tablet by mouth Daily.      olmesartan (BENICAR) " 20 MG tablet Take 1 tablet by mouth Daily As Needed (qhs). 90 tablet 3    Omega-3 Fatty Acids (fish oil) 1000 MG capsule capsule Take  by mouth Daily With Breakfast.      terconazole (TERAZOL 3) 0.8 % vaginal cream       triamcinolone (KENALOG) 0.1 % cream As Needed.      Triamcinolone Acetonide (NASACORT) 55 MCG/ACT nasal inhaler 2 sprays into the nostril(s) as directed by provider Daily As Needed for Rhinitis.       No current facility-administered medications for this visit.     Atorvastatin, Crestor [rosuvastatin], Livalo [pitavastatin], Simvastatin, and Gentamicin    Past Medical History:   Diagnosis Date    Anemia     Anxiety     Asthma     Back problem     Bronchitis     Claustrophobia     COVID-19 2021    Depression     Headache     HL (hearing loss)     Mononucleosis 2023    Pre-diabetes     Thyroid disease     Tremors of nervous system      Social History     Socioeconomic History    Marital status:    Tobacco Use    Smoking status: Former     Current packs/day: 0.00     Types: Cigarettes     Quit date: 1970     Years since quittin.3     Passive exposure: Never    Smokeless tobacco: Never   Vaping Use    Vaping status: Never Used    Passive vaping exposure: Yes   Substance and Sexual Activity    Alcohol use: Never    Drug use: Never    Sexual activity: Defer     Family History   Problem Relation Age of Onset    COPD Mother     Asthma Mother     Arthritis Mother     Hyperlipidemia Mother     Heart attack Maternal Grandfather     No Known Problems Son     No Known Problems Son     Seizures Daughter      Review of Systems   Constitutional: Positive for diaphoresis, malaise/fatigue and weight gain (+3). Negative for decreased appetite.   HENT: Negative.     Eyes:  Negative for blurred vision.   Cardiovascular:  Positive for chest pain, dyspnea on exertion, leg swelling, near-syncope and palpitations. Negative for syncope.   Respiratory:  Positive for shortness of breath. Negative for  "sleep disturbances due to breathing.    Endocrine: Negative.    Hematologic/Lymphatic: Negative for bleeding problem. Does not bruise/bleed easily.   Skin: Negative.    Musculoskeletal:  Negative for falls and myalgias.   Gastrointestinal:  Positive for bloating, abdominal pain, constipation, heartburn and nausea. Negative for melena.   Genitourinary:  Positive for frequency. Negative for hematuria.   Neurological:  Positive for dizziness, light-headedness and loss of balance.   Psychiatric/Behavioral:  Negative for altered mental status. The patient is nervous/anxious.    Allergic/Immunologic: Negative.       Objective     /80 (BP Location: Left arm)   Pulse 68   Ht 160 cm (62.99\")   Wt 77.5 kg (170 lb 12.8 oz)   BMI 30.26 kg/m²     Vitals and nursing note reviewed.   Constitutional:       General: Not in acute distress.     Appearance: Well-developed. Not diaphoretic.   Eyes:      Pupils: Pupils are equal, round, and reactive to light.   HENT:      Head: Normocephalic.   Pulmonary:      Effort: Pulmonary effort is normal. No respiratory distress.      Breath sounds: Normal breath sounds.   Cardiovascular:      Normal rate. Regular rhythm.   Pulses:     Intact distal pulses.   Edema:     Peripheral edema absent.   Abdominal:      General: Bowel sounds are normal.      Palpations: Abdomen is soft.   Musculoskeletal: Normal range of motion.      Cervical back: Normal range of motion. Skin:     General: Skin is warm and dry.   Neurological:      Mental Status: Alert and oriented to person, place, and time.        Procedures          Problem List Items Addressed This Visit          Cardiac and Vasculature    Hypercholesterolemia    Relevant Orders    Stress Test With Myocardial Perfusion One Day    Adult Transthoracic Echo Complete W/ Cont if Necessary Per Protocol    Holter Monitor - 72 Hour Up To 15 Days    Primary hypertension - Primary    Relevant Orders    Stress Test With Myocardial Perfusion One Day    " Adult Transthoracic Echo Complete W/ Cont if Necessary Per Protocol    Holter Monitor - 72 Hour Up To 15 Days       Endocrine and Metabolic    Hypothyroidism    Relevant Orders    Stress Test With Myocardial Perfusion One Day    Adult Transthoracic Echo Complete W/ Cont if Necessary Per Protocol    Holter Monitor - 72 Hour Up To 15 Days       Pulmonary and Pneumonias    Shortness of breath    Relevant Orders    Stress Test With Myocardial Perfusion One Day    Adult Transthoracic Echo Complete W/ Cont if Necessary Per Protocol    Holter Monitor - 72 Hour Up To 15 Days       Symptoms and Signs    Chronic fatigue    Relevant Orders    Stress Test With Myocardial Perfusion One Day    Adult Transthoracic Echo Complete W/ Cont if Necessary Per Protocol    Holter Monitor - 72 Hour Up To 15 Days     Other Visit Diagnoses       Chest pain, atypical        Relevant Orders    Stress Test With Myocardial Perfusion One Day    Adult Transthoracic Echo Complete W/ Cont if Necessary Per Protocol    Holter Monitor - 72 Hour Up To 15 Days           Patient presents with multiple complaints involving multiple body systems. We discussed these at length.     HTN  -borderline elevated at present  -she uses olmesartan 5-10 mg PRN if bp >130/80.   -low Na diet    Hypercholesterolemia  -recommend referral to lipid clinic  -she would like to consider this  -will place referral for consult with pharmacist.   -continue fish oil, Mediterranean diet     PSVT  -repeat holter monitor to evaluate arrhythmia    Recommend full cardiac work up with Lexiscan stress test and echocardiogram to evaluate coronary artery perfusion, LV function, heart rate and rhythm.    Further recommendations to follow. Will discuss vagus nerve dysfunction with Dr. Monk.             Electronically signed by TIARA Pastrana,  May 3, 2024 15:52 EDT

## 2024-05-10 ENCOUNTER — HOSPITAL ENCOUNTER (OUTPATIENT)
Dept: CARDIOLOGY | Facility: HOSPITAL | Age: 74
Discharge: HOME | End: 2024-05-10
Payer: MEDICARE

## 2024-05-10 DIAGNOSIS — I10 PRIMARY HYPERTENSION: ICD-10-CM

## 2024-05-10 DIAGNOSIS — E03.9 HYPOTHYROIDISM, UNSPECIFIED TYPE: ICD-10-CM

## 2024-05-10 DIAGNOSIS — E78.00 HYPERCHOLESTEROLEMIA: ICD-10-CM

## 2024-05-10 DIAGNOSIS — R07.89 CHEST PAIN, ATYPICAL: ICD-10-CM

## 2024-05-10 DIAGNOSIS — R06.02 SHORTNESS OF BREATH: ICD-10-CM

## 2024-05-10 DIAGNOSIS — R53.82 CHRONIC FATIGUE: ICD-10-CM

## 2024-05-10 PROCEDURE — 0 TECHNETIUM SESTAMIBI: Performed by: INTERNAL MEDICINE

## 2024-05-10 PROCEDURE — A9500 TC99M SESTAMIBI: HCPCS | Performed by: INTERNAL MEDICINE

## 2024-05-10 PROCEDURE — 78452 HT MUSCLE IMAGE SPECT MULT: CPT

## 2024-05-10 PROCEDURE — 93017 CV STRESS TEST TRACING ONLY: CPT

## 2024-05-10 PROCEDURE — 93306 TTE W/DOPPLER COMPLETE: CPT

## 2024-05-10 PROCEDURE — 25010000002 REGADENOSON 0.4 MG/5ML SOLUTION: Performed by: INTERNAL MEDICINE

## 2024-05-10 RX ORDER — REGADENOSON 0.08 MG/ML
0.4 INJECTION, SOLUTION INTRAVENOUS
Status: COMPLETED | OUTPATIENT
Start: 2024-05-10 | End: 2024-05-10

## 2024-05-10 RX ADMIN — TECHNETIUM TC 99M SESTAMIBI 1 DOSE: 1 INJECTION INTRAVENOUS at 09:34

## 2024-05-10 RX ADMIN — REGADENOSON 0.4 MG: 0.08 INJECTION, SOLUTION INTRAVENOUS at 09:34

## 2024-05-10 RX ADMIN — TECHNETIUM TC 99M SESTAMIBI 1 DOSE: 1 INJECTION INTRAVENOUS at 08:21

## 2024-05-11 LAB
BH CV ECHO MEAS - ACS: 1.95 CM
BH CV ECHO MEAS - AI P1/2T: 699.6 MSEC
BH CV ECHO MEAS - AO MAX PG: 4.4 MMHG
BH CV ECHO MEAS - AO MEAN PG: 2.33 MMHG
BH CV ECHO MEAS - AO ROOT DIAM: 3.2 CM
BH CV ECHO MEAS - AO V2 MAX: 104.5 CM/SEC
BH CV ECHO MEAS - AO V2 VTI: 24.6 CM
BH CV ECHO MEAS - EDV(CUBED): 80.1 ML
BH CV ECHO MEAS - EDV(MOD-SP4): 89 ML
BH CV ECHO MEAS - EF(MOD-SP4): 53.4 %
BH CV ECHO MEAS - EF_3D-VOL: 58 %
BH CV ECHO MEAS - ESV(CUBED): 31.6 ML
BH CV ECHO MEAS - ESV(MOD-SP4): 41.5 ML
BH CV ECHO MEAS - FS: 26.7 %
BH CV ECHO MEAS - IVS/LVPW: 1.4 CM
BH CV ECHO MEAS - IVSD: 1.46 CM
BH CV ECHO MEAS - LA DIMENSION: 3.3 CM
BH CV ECHO MEAS - LAT PEAK E' VEL: 10.4 CM/SEC
BH CV ECHO MEAS - LV DIASTOLIC VOL/BSA (35-75): 49.9 CM2
BH CV ECHO MEAS - LV MASS(C)D: 196.8 GRAMS
BH CV ECHO MEAS - LV SYSTOLIC VOL/BSA (12-30): 23.3 CM2
BH CV ECHO MEAS - LVIDD: 4.3 CM
BH CV ECHO MEAS - LVIDS: 3.2 CM
BH CV ECHO MEAS - LVPWD: 1.04 CM
BH CV ECHO MEAS - MED PEAK E' VEL: 5.7 CM/SEC
BH CV ECHO MEAS - MV A MAX VEL: 58.3 CM/SEC
BH CV ECHO MEAS - MV DEC SLOPE: 214.2 CM/SEC2
BH CV ECHO MEAS - MV E MAX VEL: 56.6 CM/SEC
BH CV ECHO MEAS - MV E/A: 0.97
BH CV ECHO MEAS - RAP SYSTOLE: 10 MMHG
BH CV ECHO MEAS - RVDD: 2.3 CM
BH CV ECHO MEAS - RVSP: 31.3 MMHG
BH CV ECHO MEAS - SV(MOD-SP4): 47.5 ML
BH CV ECHO MEAS - SVI(MOD-SP4): 26.6 ML/M2
BH CV ECHO MEAS - TR MAX PG: 21.3 MMHG
BH CV ECHO MEAS - TR MAX VEL: 230.8 CM/SEC
BH CV ECHO MEASUREMENTS AVERAGE E/E' RATIO: 7.03
BH CV REST NUCLEAR ISOTOPE DOSE: 10 MCI
BH CV STRESS COMMENTS STAGE 1: NORMAL
BH CV STRESS DOSE REGADENOSON STAGE 1: 0.4
BH CV STRESS DURATION MIN STAGE 1: 0
BH CV STRESS DURATION SEC STAGE 1: 10
BH CV STRESS NUCLEAR ISOTOPE DOSE: 30 MCI
BH CV STRESS PROTOCOL 1: NORMAL
BH CV STRESS RECOVERY BP: NORMAL MMHG
BH CV STRESS RECOVERY HR: 65 BPM
BH CV STRESS STAGE 1: 1
LEFT ATRIUM VOLUME INDEX: 18 ML/M2
LV EF NUC BP: 79 %
MAXIMAL PREDICTED HEART RATE: 148 BPM
PERCENT MAX PREDICTED HR: 64.19 %
STRESS BASELINE BP: NORMAL MMHG
STRESS BASELINE HR: 56 BPM
STRESS PERCENT HR: 76 %
STRESS POST PEAK BP: NORMAL MMHG
STRESS POST PEAK HR: 95 BPM
STRESS TARGET HR: 126 BPM

## 2024-05-22 ENCOUNTER — TELEPHONE (OUTPATIENT)
Dept: CARDIOLOGY | Facility: CLINIC | Age: 74
End: 2024-05-22
Payer: MEDICARE

## 2024-05-22 NOTE — TELEPHONE ENCOUNTER
Vital signs look normal.     Cardiac work up is normal. Stress and echo are normal.     I think we need the holter to make sure there is no arrhythmia    Regarding the vagus nerve problem- I did discuss this with Dr. Monk.  There is nothing cardiacwise to be done if the blood pressure and heart rate are normal.     Need to see neurology regarding that problem.     Does she want to have a Tilt Table Test in [place]?

## 2024-05-22 NOTE — TELEPHONE ENCOUNTER
Patient made aware testing has been normal, V/S normal. Need to wear holter to make sure no arrhythmia. Aware discussion with Dr Monk concerning vagus nerve problem, cardiacwise nothing to be done if the blood pressure and heart rate are normal. Need to see neurology regarding vagus nerve. Patient does not wish to have tilt table test at this time, she wishes to wait on holter results.   SW met with this pt for a daily check in. Pt observed sitting in the dinning room, socializing with a peer. Pt states that she is willing to work with clinical staff, and waiting until staff feels she is ready to discharge. Pt states that she cannot wait to be outside again, and plans on spending her first day out of the hospital at the park getting fresh air. Pt appears friendly and pleasant with this . Pt's eye-contact is good. Pt appears linear and logical. Pt is currently denying SI/ HI/ hallucinations/ delusions. Pt's discharge plan is to return home where she resides with her fiance. Collateral gained from pt's fiance solidifying this discharge plan. Pt's cousin or fiance will provide transportation. Appointments scheduled with [facility] for follow up. SISSY will continue to monitor this pt's moods and behaviors.

## 2024-05-22 NOTE — TELEPHONE ENCOUNTER
Patient's monitor was enrolled on [employer] website. Patient had been previously enrolled, but monitor was not sent.

## 2024-05-22 NOTE — TELEPHONE ENCOUNTER
Patient called and relayed that they have still been experiencing rapid heart rate, shortness of breath, and fatigue. Patient has been holding off olmesartan and hctz due to blood pressure being low. Patient said it usually dips to 107 systolic and was unsure of diastolic. Patient most recent blood pressure was 118/67 and heart rate was 64. Patient also relays that both her pulmonologist and dentist has relayed that she may have circulation problem. She also has questions about her vagus nerve, stating it may be the reason for her symptoms.

## 2024-05-22 NOTE — TELEPHONE ENCOUNTER
Caller: Shelly Stevens    Relationship: Self    Best call back number: [phone] (home)     What is the best time to reach you: ANY    Who are you requesting to speak with (clinical staff, provider,  specific staff member): CLINICAL    What was the call regarding: PATIENT REQUESTS TIARA ESCALERA TO CONTACT HER REGARDING MULTIPLE QUESTIONS. PATIENT ALSO STATED THAT SHE HAS NOT RECEIVED HER HOLTER MONITOR. PATIENT DISCONNECTED BEFORE TRANSFER TO THE OFFICE.     Is it okay if the provider responds through [employer]: PLEASE CALL

## 2024-06-11 ENCOUNTER — TELEPHONE (OUTPATIENT)
Dept: CARDIOLOGY | Facility: CLINIC | Age: 74
End: 2024-06-11
Payer: MEDICARE

## 2024-06-11 NOTE — TELEPHONE ENCOUNTER
While speaking with patient concerning holter monitor results. Patient had PFT from Dr Vora and her dentist note to be sent here for review. Patient was told she has circulatory problem from Dr Vora. She is asking for notes to be reviewed.     Records in chart.

## 2024-06-12 NOTE — TELEPHONE ENCOUNTER
Cardiac testing showed normal perfusion, normal pump (no evidence of CAD, no heart failure) She is not anemic.    I can't give her a cardiac explanation of the decreased diffusion.     There are multiple possibilities for this- hypertension, COPD, asthma, anemia, CHF, ILD, pulmonary embolus    She does have HTN and asthma/bronchitis     Regarding the letter from her dentist- she complains of left lower molar pain but all dental work up normal. Referred to  dentistry and all work up normal. They suggested jaw pain may be cardiac in nature. Again, her stress and echo were normal. We can consider cardiac cath or cardiac CTA for further imaging if she wishes.

## 2024-06-13 NOTE — TELEPHONE ENCOUNTER
Spoke with patient concerning cardiac testing and multiple possibilities. Aware records from Dr Vora and dentistry have been reviewed. Aware can consider cardiac cath or cardiac CTA for further imaging if she wishes. Patient reports she wishes to wait on cardiac cath and CTA at this time. She will decide and call office back if she decides to have further testing.

## 2024-06-14 ENCOUNTER — SPECIALTY PHARMACY (OUTPATIENT)
Dept: PHARMACY | Facility: HOSPITAL | Age: 74
End: 2024-06-14
Payer: MEDICARE

## 2024-06-14 ENCOUNTER — HOSPITAL ENCOUNTER (OUTPATIENT)
Dept: CARDIOLOGY | Facility: HOSPITAL | Age: 74
Discharge: HOME | End: 2024-06-14
Payer: MEDICARE

## 2024-06-14 DIAGNOSIS — E78.00 HYPERCHOLESTEROLEMIA: Primary | ICD-10-CM

## 2024-06-14 NOTE — PROGRESS NOTES
[facility]  Lipid Management Program - Leqvio (Inclisiran)     Shelly Stevens is a 73 y.o. female referred to the [facility] by Mary Winn for clinical pharmacy and specialty pharmacy management of [facility].  Shelly Stevens is  treated for hyperlipidemia and currently takes fish oil.  In the past, Pt has tried atorvastatin, crestor, pitavastatin, Livalo and zetia and could not tolerate due to severe muscle pains.     Relevant Past Medical History and Comorbidities  Past Medical History:   Diagnosis Date    Anemia     Anxiety     Asthma     Back problem     Bronchitis     Claustrophobia     COVID-19 2021    Depression     Headache     HL (hearing loss)     Mononucleosis 2023    Pre-diabetes     Thyroid disease     Tremors of nervous system      Social History     Socioeconomic History    Marital status:    Tobacco Use    Smoking status: Former     Current packs/day: 0.00     Types: Cigarettes     Quit date: 1970     Years since quittin.4     Passive exposure: Never    Smokeless tobacco: Never   Vaping Use    Vaping status: Never Used    Passive vaping exposure: Yes   Substance and Sexual Activity    Alcohol use: Never    Drug use: Never    Sexual activity: Defer       Allergies  Atorvastatin, Crestor [rosuvastatin], Livalo [pitavastatin], Simvastatin, and Gentamicin    Current Medication List    Current Outpatient Medications:     albuterol (ProAir RespiClick) 108 (90 Base) MCG/ACT inhaler, 1 puff Every 6 (Six) Hours As Needed., Disp: , Rfl:     Asmanex, 30 Metered Doses, 220 MCG/ACT inhaler, 1 puff As Needed., Disp: , Rfl:     cycloSPORINE (RESTASIS) 0.05 % ophthalmic emulsion, , Disp: , Rfl:     dexlansoprazole (DEXILANT) 60 MG capsule, Daily As Needed., Disp: , Rfl:     famotidine (PEPCID) 20 MG tablet, At Night As Needed., Disp: , Rfl:     hydroCHLOROthiazide (HYDRODIURIL) 12.5 MG tablet, Take 1 tablet by mouth Daily As Needed., Disp: , Rfl:      Inclisiran Sodium 284 MG/1.5ML solution prefilled syringe, Inject 1.5 mL under the skin into the appropriate area as directed Every 3 (Three) Months., Disp: 1.5 mL, Rfl: 1    levothyroxine (SYNTHROID, LEVOTHROID) 75 MCG tablet, Take 1 tablet by mouth Daily., Disp: , Rfl:     montelukast (SINGULAIR) 10 MG tablet, Take 1 tablet by mouth Every Night., Disp: , Rfl:     multivitamin with minerals tablet tablet, Take 1 tablet by mouth Daily., Disp: , Rfl:     olmesartan (BENICAR) 20 MG tablet, Take 1 tablet by mouth Daily As Needed (qhs)., Disp: 90 tablet, Rfl: 3    Omega-3 Fatty Acids (fish oil) 1000 MG capsule capsule, Take  by mouth Daily With Breakfast., Disp: , Rfl:     terconazole (TERAZOL 3) 0.8 % vaginal cream, , Disp: , Rfl:     triamcinolone (KENALOG) 0.1 % cream, As Needed., Disp: , Rfl:     Triamcinolone Acetonide (NASACORT) 55 MCG/ACT nasal inhaler, 2 sprays into the nostril(s) as directed by provider Daily As Needed for Rhinitis., Disp: , Rfl:     Drug Interactions  None with Leqvio     Relevant Laboratory Values      Medication Assessment & Plan    Nohelia Nelson, PharmD Candidate Administered Leqvio 284mg SUBQ in back of her right arm. Pt was monitored for 30 minutes after injection because she said she felt funny after. She said she felt ok by the time it was time for her to go.   Medication education provided, including:   Common Adverse Effects: Injection site reactions, arthralgias, & bronchitis.  Potential for allergic reaction.  Go to ED/call 911 with any S/Sx of allergic reaction.   Must be administered by a HCP.  If a dose is missed, please call to reschedule.   Expect LDL reduction, to help reduce cardiovascular risk.   At each visit, patient will need to stay a minimum of 15 min for monitoring   Lipid panel will be checked 4 to 12 weeks after starting therapy, order placed.   Patient will continue regular follow-up with cardiology  Will follow up in 3 months for next injection.     Maddie De Jesus  Scent, PharmD  6/14/2024  14:09 EDT

## 2024-06-18 ENCOUNTER — TELEPHONE (OUTPATIENT)
Dept: CARDIOLOGY | Facility: CLINIC | Age: 74
End: 2024-06-18
Payer: MEDICARE

## 2024-06-18 NOTE — TELEPHONE ENCOUNTER
Caller: Shelly Stevens     Relationship: SELF    Best call back number:[phone]    What is your medical concern? REACTION TO LEQVIA    How long has this issue been going on? 6-14-24    Is your provider already aware of this issue? NO    Have you been treated for this issue? NO    PATIENT HAD REACTION TO THE LEQVIA AND WANTS TO SPEAK TO TIARA ESCALERA ABOUT THE MEDICATION FOR HER HEART., BEFORE SHE STARTS TAKING IT. PLEASE REACH OUT TO PATIENT IN THE AFTERNOON. PATIENT IS VERY WEAK AND TIRED. (PCP GAVE PATIENT A B-12 SHOT YESTERDAY.)

## 2024-06-19 RX ORDER — FLECAINIDE ACETATE 50 MG/1
50 TABLET ORAL 2 TIMES DAILY
Qty: 60 TABLET | Refills: 11 | Status: SHIPPED | OUTPATIENT
Start: 2024-06-19

## 2024-06-19 NOTE — TELEPHONE ENCOUNTER
I don't think this is from Kidamom.     No serious adverse reactions have ever been reported. Mild reactions may include injection site redness, joint pain or bronchitis.     Weakness and tiredness not related.     Is something else going on with her? How is bp and hr? Palpitations?

## 2024-06-19 NOTE — TELEPHONE ENCOUNTER
"Spoke with patient concerning Leqvio and reactions, aware weakness and tiredness not related. Asking patient if she is having palpitations, BP and HR. Patient states  \"something happened when I got the injection, I am sensitive to medications and I do not want to take another injection\".  "

## 2024-06-24 ENCOUNTER — TELEPHONE (OUTPATIENT)
Dept: CARDIOLOGY | Facility: CLINIC | Age: 74
End: 2024-06-24
Payer: MEDICARE

## 2024-06-24 NOTE — TELEPHONE ENCOUNTER
Her vitals look really good.     If she can continue flecainide for a few days, then we can repeat her Holter monitor and see if the arrhythmia has improved.  If she absolutely cannot tolerate it, then stop.      Olmesartan max dose is around 40 mg daily

## 2024-06-24 NOTE — TELEPHONE ENCOUNTER
Hub staff attempted to follow warm transfer process and was unsuccessful     Caller: Shelly Stevens    Relationship to patient: Self    Best call back number: [phone]     Patient is needing: PT WAS GIVEN NEW MEDS TO TRY, flecainide (TAMBOCOR) 50 MG tablet [21171] (Order [id number]) TOOK THIS @ 8:00AM THIS MORNING     PT REPORTS HEART RACING ALL NIGHT LONG/ ALL DAY TODAY    2 DAYS STRAIGHT OF HEART RACING   STATES HEART RACES ALL OF THE TIME BUT IS MORE ACTIVE NOW   UNSURE OF HOW MUCH MORE MEDS SHE CAN/ SHOULD TAKE     12:30AM 20MG OF olmesartan   3:45AM TOOK 12.5 hydroCHLOROthiazide   6:30AM 10MG OF olmesartan   NOW BP /86   130/87  STATES THIS WOULD BE LOWER WITH MEDS   ALSO REPORTS WEAKNESS

## 2024-06-24 NOTE — TELEPHONE ENCOUNTER
Spoke with patient concerning symptoms and medications. Patient reports she had been up most of the night with her heart racing and elevated BP. Last night she took BP with wrist monitor with elevated BP, she took Olmesartan 20 mg at 12:30 am, HCTZ 12.5 mg at 3:45 am and Olmesartan 10 mg at 6:30 am. BP was then noted at 137/86 and 130/87.    She took first dose of Flecainide 50 mg this morning, she does feel like it did help some during the day. While speaking with patient on the phone, LPN advised her to use BP monitor for arm with BP reading 109/59 and HR 66.     Patient is asking what maximum dose of Olmesartan she can take daily PRN and what dose of HCTZ PRN she can take for elevated BP? She is also asking for refills on HCTZ to be sent to [facility] pharmacy.

## 2024-06-24 NOTE — TELEPHONE ENCOUNTER
Is it ok for her to take olmesartan 40 mg daily PRN and what dose do you want her to take of HCTZ PRN?

## 2024-06-26 ENCOUNTER — CLINICAL SUPPORT (OUTPATIENT)
Dept: CARDIOLOGY | Facility: CLINIC | Age: 74
End: 2024-06-26
Payer: MEDICARE

## 2024-06-26 ENCOUNTER — TELEPHONE (OUTPATIENT)
Dept: CARDIOLOGY | Facility: CLINIC | Age: 74
End: 2024-06-26

## 2024-06-26 DIAGNOSIS — I47.10 PAROXYSMAL SVT (SUPRAVENTRICULAR TACHYCARDIA): Primary | ICD-10-CM

## 2024-06-26 PROCEDURE — 93000 ELECTROCARDIOGRAM COMPLETE: CPT | Performed by: NURSE PRACTITIONER

## 2024-06-26 NOTE — TELEPHONE ENCOUNTER
Patient made aware while in office for EKG. Patient also relayed that they have been having a trembling sensation in stomach that keeps her up at night. She stated that she does have an appointment with neurology and just wanted cardiology to be aware in case it could be heart related.

## 2024-06-26 NOTE — PROGRESS NOTES
Procedure     ECG 12 Lead    Date/Time: 6/26/2024 4:18 PM  Performed by: Mary Winn APRN    Authorized by: Mary Winn APRN  Comparison: compared with previous ECG   Similar to previous ECG  Rhythm: sinus rhythm  Rate: normal  BPM: 64  ST Segments: ST segments normal    Clinical impression: normal ECG  Comments: QTc 447 ms

## 2024-06-26 NOTE — TELEPHONE ENCOUNTER
Can we let Shelly know her EKG looks great.  She is in sinus rhythm, heart rate is 64, QT interval is normal.    Can she give the flecainide a few more days to work.  If she would feel more comfortable, we can place Holter to evaluate the SVT burden.  Just let me know.

## 2024-06-26 NOTE — TELEPHONE ENCOUNTER
Patient made aware of EKG results and recommendations to give flecainide a few more days. Patient reports she feels flecainide is helping and will continue. Patient reports she continues to have jumping and twitching feelings in left abd, she is willing to proceed with holter monitor if you feel needed.

## 2024-06-28 NOTE — TELEPHONE ENCOUNTER
Patient made aware will wait on holter for now, monitor symptoms and let us know if symptoms worsen. Patient voiced understanding.

## 2024-07-02 ENCOUNTER — TELEPHONE (OUTPATIENT)
Dept: PHARMACY | Facility: HOSPITAL | Age: 74
End: 2024-07-02
Payer: MEDICARE

## 2024-07-02 NOTE — TELEPHONE ENCOUNTER
Patient called clinic to inform us that she does not want to take another dose of Leqvio. She reports that she had a headache and pressure in the top of her head immediately following administration. She says that she felt dizzy and sleepy the rest of the day/night and still felt this way the following day. Discussed cholesterol injection options with patient. She is unable to do Repatha Sureclick due to a reported latex allergy. She is unsure about Praluent and would like to discuss this with Mary Winn/Dr. Monk before deciding. Patient says she will call our clinic to let us know what she decides.    Kelley Santoyo, PharmD  7/2/2024  11:21 EDT

## 2024-07-03 RX ORDER — HYDROCHLOROTHIAZIDE 12.5 MG/1
12.5 TABLET ORAL DAILY PRN
Qty: 30 TABLET | Refills: 1 | Status: SHIPPED | OUTPATIENT
Start: 2024-07-03

## 2024-07-03 NOTE — TELEPHONE ENCOUNTER
She is asking for her HCTZ refill to be sent to [facility].  BP today 124/79 HR 58-59.  She states she monitors her BP and some weeks does not need any PRN BP meds, but needs a refill on her HCTZ.  Patient called asking if her PRN olmesartan 20 mg could cause tongue and throat swelling and that she is also following with ENT for these problems. She does not want to take the PRN olmesartan to see if that helps with her tongue swelling.  I advised patient if she has these symptoms and are worsening to go to ER.

## 2024-07-23 ENCOUNTER — TELEPHONE (OUTPATIENT)
Dept: CARDIOLOGY | Facility: CLINIC | Age: 74
End: 2024-07-23
Payer: MEDICARE

## 2024-07-23 DIAGNOSIS — I47.10 PAROXYSMAL SVT (SUPRAVENTRICULAR TACHYCARDIA): Primary | ICD-10-CM

## 2024-07-23 NOTE — TELEPHONE ENCOUNTER
Caller: SHABNAM AGUIRRE    Relationship: SELF    Best call back number: [phone]    What is the best time to reach you: AFTERNOON    Who are you requesting to speak with (clinical staff, provider,  specific staff member): CLINICAL         What was the call regarding: WISHES TO TAKE ABOUT CHOLESTEROL SHOT, AND ALSO, WANTS TO NOW IF SHE HAS TO WEAR THE HOLSTER MONITOR.     Is it okay if the provider responds through [employer]: NO PLEASE CALL AND ADVISE

## 2024-07-23 NOTE — TELEPHONE ENCOUNTER
Spoke with patient, reports having reaction to Inclisiran, made her head feel odd for 3 days after having injection. Reports heart rate remains irregular yet some better with Flecainide. She remain short of breath and extreme fatigue. BP has been normal. She did have abnormal thyroid level and PCP increased synthroid. Patient is asking if she needs to proceed with holter monitor?

## 2024-07-29 ENCOUNTER — TELEPHONE (OUTPATIENT)
Dept: CARDIOLOGY | Facility: CLINIC | Age: 74
End: 2024-07-29
Payer: MEDICARE

## 2024-07-29 RX ORDER — HYDROCHLOROTHIAZIDE 12.5 MG/1
12.5 TABLET ORAL DAILY PRN
Qty: 30 TABLET | Refills: 1 | Status: SHIPPED | OUTPATIENT
Start: 2024-07-29

## 2024-07-29 NOTE — TELEPHONE ENCOUNTER
"Caller: Shelly Stevens    Relationship: Self    Best call back number: [phone]     What is the best time to reach you: ANYTIME IN THE AFTERNOON     What was the call regarding: PT WAS ORDERED A ZIO TO WEAR, WONDERING IF HER INSURANCE WILL PAY FOR THIS BEFORE SHE PUTS THIS ON     PT HAD THE CHOLESTEROL SHOT DONE A FEW MONTHS AGO, STATES SHE FELT \"FUNNY\" WHEN SHE TOOK THIS LAST TIME, WONDERING IF THERE IS SOMETHING ELSE SHE CAN TAKE     PT IS ALSO CONCERNED ABOUT HER hydroCHLOROthiazide, WANTS TO KNOW IF THIS IS OKAY TO TAKE WITH HEART PILL THAT SLOWS HER HEART DOWN  flecainide    STATES THE Flecainide HAS BEEN WORKING BUT HER HEART STILL RACES SOME, LEVOTHYROXINE SODIUM HAS BEEN INCREASED TO 88.     Is it okay if the provider responds through MyChart: CALL   "

## 2024-07-29 NOTE — TELEPHONE ENCOUNTER
Yes, she can take hctz with flecainide     I have sent refill.     Let's wait until Leqvio is out of her system before we decide what to do.

## 2024-07-29 NOTE — TELEPHONE ENCOUNTER
Caller: Shelly Stevens    Relationship: Self    Best call back number: [phone]     Requested Prescriptions:   Requested Prescriptions     Pending Prescriptions Disp Refills    hydroCHLOROthiazide 12.5 MG tablet 30 tablet 1     Sig: Take 1 tablet by mouth Daily As Needed (elevated BP).        Pharmacy where request should be sent: [facility] [place], [place] - [address] - [phone] PH - [phone] FX     Last office visit with prescribing clinician: 11/27/2023   Last telemedicine visit with prescribing clinician: Visit date not found   Next office visit with prescribing clinician: Visit date not found     Additional details provided by patient: 1 WEEK TO 10 DAYS     Does the patient have less than a 3 day supply:  [] Yes  [x] No    Would you like a call back once the refill request has been completed: [] Yes [] No    If the office needs to give you a call back, can they leave a voicemail: [] Yes [] No    Davion Alonso Rep   07/29/24 09:25 EDT

## 2024-07-29 NOTE — TELEPHONE ENCOUNTER
Spoke with patient, patient will be seeing Dr. Donohue at 4 pm today for shortness of breath. Patient has been taking flecainide and was made aware to call Zio to discuss if their insurance has covered the monitor. Patient will need a refill for hydrochlorothiazide to go to [facility]. Due to patient side effects to 3 month cholesterol medication and not being able to tolerate oral cholesterol medications, would you like patient to try a different cholesterol shot?

## 2024-08-14 ENCOUNTER — TELEPHONE (OUTPATIENT)
Dept: CARDIOLOGY | Facility: CLINIC | Age: 74
End: 2024-08-14
Payer: MEDICARE

## 2024-08-14 NOTE — TELEPHONE ENCOUNTER
Caller: Shelly Stevens    Relationship: Self    Best call back number: [phone] (home)     What is the best time to reach you: AFTERNOONS    Who are you requesting to speak with (clinical staff, provider,  specific staff member): CLINICAL    What was the call regarding: PT MISSED CALL. NO NOTES IN CHART. PLEASE CALL PT WHEN AVAILABLE.

## 2024-08-22 ENCOUNTER — TELEPHONE (OUTPATIENT)
Dept: CARDIOLOGY | Facility: CLINIC | Age: 74
End: 2024-08-22
Payer: MEDICARE

## 2024-08-22 DIAGNOSIS — I47.10 PAROXYSMAL SVT (SUPRAVENTRICULAR TACHYCARDIA): Primary | ICD-10-CM

## 2024-08-22 NOTE — TELEPHONE ENCOUNTER
Patient relayed that she has been having SOA, Headaches, wheezing, dry cough, trouble breathing, insomnia, and stomach issues. Patient relays that it began to worsen with the Flecainide and would like to know if there would be an alternative medication she could take.

## 2024-08-23 ENCOUNTER — TELEPHONE (OUTPATIENT)
Dept: CARDIOLOGY | Facility: CLINIC | Age: 74
End: 2024-08-23
Payer: MEDICARE

## 2024-08-23 NOTE — TELEPHONE ENCOUNTER
Patient made aware and replied in understanding, patient going to try going back on usual dose and not decrease until they are seen by EP.

## 2024-08-23 NOTE — TELEPHONE ENCOUNTER
Patient called to ask if it would be okay to take one tablet of flecainide instead of two until seen by EP due to heart rate going too fast causing her unable to rest.

## 2024-08-28 ENCOUNTER — OFFICE VISIT (OUTPATIENT)
Dept: CARDIOLOGY | Facility: CLINIC | Age: 74
End: 2024-08-28
Payer: MEDICARE

## 2024-08-28 VITALS
OXYGEN SATURATION: 96 % | WEIGHT: 174.4 LBS | SYSTOLIC BLOOD PRESSURE: 144 MMHG | DIASTOLIC BLOOD PRESSURE: 72 MMHG | BODY MASS INDEX: 30.9 KG/M2 | HEART RATE: 55 BPM | HEIGHT: 63 IN

## 2024-08-28 DIAGNOSIS — I47.10 PSVT (PAROXYSMAL SUPRAVENTRICULAR TACHYCARDIA): Primary | ICD-10-CM

## 2024-08-28 PROCEDURE — 93000 ELECTROCARDIOGRAM COMPLETE: CPT | Performed by: PHYSICIAN ASSISTANT

## 2024-08-28 PROCEDURE — 3078F DIAST BP <80 MM HG: CPT | Performed by: PHYSICIAN ASSISTANT

## 2024-08-28 PROCEDURE — 3077F SYST BP >= 140 MM HG: CPT | Performed by: PHYSICIAN ASSISTANT

## 2024-08-28 PROCEDURE — 99214 OFFICE O/P EST MOD 30 MIN: CPT | Performed by: PHYSICIAN ASSISTANT

## 2024-08-28 NOTE — PROGRESS NOTES
[facility]   OFFICE NOTE      Shelly Stevens  1950  PCP: Sriram Morris APRN    SUBJECTIVE:   Shelly Stevens is a 73 y.o. female seen for a follow up visit regarding the following:     CC:Palpitations     HPI:   Pleasant 73-year-old female returns today follow-up regarding tachypalpitations vasovagal symptoms.  She was last seen by our office over a year ago.  During this time she was having vasovagal symptoms.  However she just changed over to now she is having tachypalpitations on a regular basis.  She has known occasional bradycardia as well.  She had a repeat stress test and echocardiogram Dr. Monk which was normal.  Holter monitor revealed significant episodes of atrial tachycardia is quite significant.  She was placed on flecainide therapy which has reduced her tachycardia palpitation unfortunately she is now not tolerating flecainide due to chest pain chest tightness and worsening shortness of breath.  She comes today to discuss further options regarding treatment of atrial tachycardia.    Cardiac PMH: (Old records have been reviewed and summarized below)  Near syncope-symptoms suggest Vasovagal syncope, atypical chest pain  Normal MPS 2019 Juan José  Echocardiogram EF Normal, diastolic dysfunction. Mild AI, MR. 2019  Negative 24 hour monitor Dr. Can  Negative Carotid  scan 2019 Dr. Can  Echocardiogram EF 70%, Mild AI, MR borderline LVH  Negative stress test for Ischemia Normal EF 5/10/2024.   AT  Holter monitor Edda 10, 2024 multiple episodes of tacky palpitations with atrial tachycardia correlating with symptoms  Repeat monitor August 9, 2024 reduction in tachypalpitations, fewer atrial tachycardia episodes  Asthma, Recent COVID 7/2022  Labile BP, Marginal SBP  Depression, Anxiety  Hypothyroidism  GERD  HLD  Multiple drug allergies-Statin, Repatha     Past Medical History, Past Surgical History, Family history, Social History, and Medications were all  reviewed with the patient today and updated as necessary.       Current Outpatient Medications:     albuterol (ProAir RespiClick) 108 (90 Base) MCG/ACT inhaler, 1 puff Every 6 (Six) Hours As Needed., Disp: , Rfl:     Asmanex, 30 Metered Doses, 220 MCG/ACT inhaler, 1 puff As Needed., Disp: , Rfl:     cycloSPORINE (RESTASIS) 0.05 % ophthalmic emulsion, , Disp: , Rfl:     dexlansoprazole (DEXILANT) 60 MG capsule, Daily As Needed., Disp: , Rfl:     flecainide (TAMBOCOR) 50 MG tablet, Take 1 tablet by mouth 2 (Two) Times a Day., Disp: 60 tablet, Rfl: 11    hydroCHLOROthiazide 12.5 MG tablet, Take 1 tablet by mouth Daily As Needed (elevated BP)., Disp: 30 tablet, Rfl: 1    levothyroxine (SYNTHROID, LEVOTHROID) 75 MCG tablet, Take 1 tablet by mouth Daily., Disp: , Rfl:     montelukast (SINGULAIR) 10 MG tablet, Take 1 tablet by mouth Every Night., Disp: , Rfl:     multivitamin with minerals tablet tablet, Take 1 tablet by mouth Daily., Disp: , Rfl:     olmesartan (BENICAR) 20 MG tablet, Take 1 tablet by mouth Daily As Needed (qhs)., Disp: 90 tablet, Rfl: 3    Omega-3 Fatty Acids (fish oil) 1000 MG capsule capsule, Take  by mouth Daily With Breakfast., Disp: , Rfl:     terconazole (TERAZOL 3) 0.8 % vaginal cream, , Disp: , Rfl:     Triamcinolone Acetonide (NASACORT) 55 MCG/ACT nasal inhaler, 2 sprays into the nostril(s) as directed by provider Daily As Needed for Rhinitis., Disp: , Rfl:     famotidine (PEPCID) 20 MG tablet, At Night As Needed. (Patient not taking: Reported on 8/28/2024), Disp: , Rfl:     Inclisiran Sodium 284 MG/1.5ML solution prefilled syringe, Inject 1.5 mL under the skin into the appropriate area as directed Every 3 (Three) Months. (Patient not taking: Reported on 8/28/2024), Disp: 1.5 mL, Rfl: 1    triamcinolone (KENALOG) 0.1 % cream, As Needed. (Patient not taking: Reported on 6/14/2024), Disp: , Rfl:       Allergies   Allergen Reactions    Atorvastatin Myalgia    Crestor [Rosuvastatin] Myalgia     "Livalo [Pitavastatin] Myalgia    Simvastatin Myalgia    Latex Itching    Gentamicin Palpitations     Other reaction(s): VOMITING         PHYSICAL EXAM:    /72 (BP Location: Right arm, Patient Position: Sitting, Cuff Size: Adult)   Pulse 55   Ht 160 cm (63\")   Wt 79.1 kg (174 lb 6.4 oz)   SpO2 96%   BMI 30.89 kg/m²        Wt Readings from Last 5 Encounters:   08/28/24 79.1 kg (174 lb 6.4 oz)   05/01/24 77.5 kg (170 lb 12.8 oz)   03/18/24 75.9 kg (167 lb 6.4 oz)   11/27/23 78.9 kg (174 lb)   09/20/23 78 kg (172 lb)       BP Readings from Last 5 Encounters:   08/28/24 144/72   05/01/24 144/80   03/18/24 (!) 166/110   12/04/23 118/64   11/27/23 160/88       General appearance - Alert, well appearing, and in no distress   Mental status - Affect appropriate to mood.  Eyes - Sclerae anicteric,  ENMT - Hearing grossly normal bilaterally, Dental hygiene good.  Neck - Carotids upstroke normal bilaterally, no bruits, no JVD.  Resp - Clear to auscultation, no wheezes, rales or rhonchi, symmetric air entry.  Heart - Normal rate, regular rhythm, normal S1, S2, no murmurs, rubs, clicks or gallops.  GI - Soft, nontender, nondistended, no masses or organomegaly.  Neurological - Grossly intact - normal speech, no focal findings  Musculoskeletal - No joint tenderness, deformity or swelling, no muscular tenderness noted.  Extremities - Peripheral pulses normal, no pedal edema, no clubbing or cyanosis.  Skin - Normal coloration and turgor.  Psych -  oriented to person, place, and time.    Medical problems and test results were reviewed with the patient today.     No results found for this or any previous visit (from the past 672 hour(s)).      EKG: (EKG has been independently visualized by me and summarized below)    ECG 12 Lead    Date/Time: 8/28/2024 4:30 PM  Performed by: Tian Turner PA    Authorized by: Tian Turner PA  Comparison: compared with previous ECG from 6/26/2024  Similar to previous ECG  Rhythm: " "sinus rhythm  Rate: normal  Conduction: conduction normal  ST Segments: ST segments normal  QRS axis: normal    Clinical impression: normal ECG           ASSESSMENT   1. SVT: AT by Holter 6/2024, and 8/9/2024.  Flecanide rx initiated by Dr. Virgen.  EKG today stable.  Patient states she cannot tolerate this drug due to \"chest tightness\"    2. SOB: Asthma, Pulmonary evualtion ending workup.      3. Normal Stress test, Echocardiogram 7/2024 Dr. Monk.     4.  Vaso vagal symptoms-bradycardia episodes        PLAN  Patient is not currently tolerating flecainide therapy due to chest tightness.  She would like to stop this medication now.  We offered option starting Rythmol therapy but she declines for now.     We discussed other options such as adding back low-dose beta-blocker, she has had previous low heart rates in the past and may not tolerate this.         She would like to discuss further with Dr. Villalobos consideration of ablation procedure.  We discussed the risk of visit this procedure to and she would like to see him in the office.  For now she will stop flecainide and consider adding back beta-blocker therapy in 1 week.  Return for follow-up as scheduled.        8/28/2024  16:28 EDT  Electronically signed by YUE Patino, 08/28/24, 4:30 PM EDT.   "

## 2024-08-29 RX ORDER — BISOPROLOL FUMARATE 5 MG/1
2.5 TABLET, FILM COATED ORAL DAILY
Qty: 30 TABLET | Refills: 6 | Status: SHIPPED | OUTPATIENT
Start: 2024-08-29 | End: 2024-08-29 | Stop reason: SDUPTHER

## 2024-08-29 RX ORDER — BISOPROLOL FUMARATE 5 MG/1
2.5 TABLET, FILM COATED ORAL DAILY
Qty: 30 TABLET | Refills: 6 | Status: SHIPPED | OUTPATIENT
Start: 2024-08-29

## 2024-08-30 ENCOUNTER — TELEPHONE (OUTPATIENT)
Dept: CARDIOLOGY | Facility: CLINIC | Age: 74
End: 2024-08-30
Payer: MEDICARE

## 2024-08-30 NOTE — TELEPHONE ENCOUNTER
Called pt to make her aware that YUE Frances called in Bisoprolol 5 mg 0.5 tablet daily to her preferred pharmacy.  PT states she is still currently still having some palps but feels much better over all since d/c the flecainide.      PT will discuss possibly starting the rythmol at her next appt with Dr Villalobos    PT will call back if she has any issues with the bisoprolol     PT verbalized understanding

## 2024-09-13 ENCOUNTER — OFFICE VISIT (OUTPATIENT)
Dept: NEUROLOGY | Facility: CLINIC | Age: 74
End: 2024-09-13
Payer: MEDICARE

## 2024-09-13 ENCOUNTER — LAB (OUTPATIENT)
Dept: LAB | Facility: HOSPITAL | Age: 74
End: 2024-09-13
Payer: MEDICARE

## 2024-09-13 VITALS
DIASTOLIC BLOOD PRESSURE: 84 MMHG | BODY MASS INDEX: 30.83 KG/M2 | SYSTOLIC BLOOD PRESSURE: 130 MMHG | HEIGHT: 63 IN | OXYGEN SATURATION: 97 % | HEART RATE: 57 BPM | WEIGHT: 174 LBS

## 2024-09-13 DIAGNOSIS — R53.1 GENERALIZED WEAKNESS: ICD-10-CM

## 2024-09-13 DIAGNOSIS — R25.1 TREMOR: ICD-10-CM

## 2024-09-13 DIAGNOSIS — R41.3 MEMORY LOSS: ICD-10-CM

## 2024-09-13 DIAGNOSIS — R41.3 MEMORY LOSS: Primary | ICD-10-CM

## 2024-09-13 DIAGNOSIS — E78.00 HYPERCHOLESTEROLEMIA: ICD-10-CM

## 2024-09-13 PROBLEM — G24.5 BLEPHAROSPASM OF BOTH EYES: Status: ACTIVE | Noted: 2022-02-16

## 2024-09-13 PROBLEM — R45.89 ANXIETY ABOUT HEALTH: Status: ACTIVE | Noted: 2024-09-13

## 2024-09-13 PROBLEM — I47.19 ATRIAL TACHYCARDIA: Status: ACTIVE | Noted: 2024-09-13

## 2024-09-13 LAB
CHOLEST SERPL-MCNC: 143 MG/DL (ref 0–200)
HDLC SERPL-MCNC: 49 MG/DL (ref 40–60)
LDLC SERPL CALC-MCNC: 80 MG/DL (ref 0–100)
LDLC/HDLC SERPL: 1.64 {RATIO}
TRIGL SERPL-MCNC: 67 MG/DL (ref 0–150)
TSH SERPL DL<=0.05 MIU/L-ACNC: 0.7 UIU/ML (ref 0.27–4.2)
VLDLC SERPL-MCNC: 14 MG/DL (ref 5–40)

## 2024-09-13 PROCEDURE — 86366 MUSCLE-SPECIFIC KINASE ANTB: CPT

## 2024-09-13 PROCEDURE — 36415 COLL VENOUS BLD VENIPUNCTURE: CPT

## 2024-09-13 PROCEDURE — 80061 LIPID PANEL: CPT

## 2024-09-13 PROCEDURE — 86042 ACETYLCHOLN RCPTR BLCKG ANTB: CPT

## 2024-09-13 PROCEDURE — 86592 SYPHILIS TEST NON-TREP QUAL: CPT

## 2024-09-13 PROCEDURE — 84439 ASSAY OF FREE THYROXINE: CPT

## 2024-09-13 PROCEDURE — 86043 ACETYLCHOLN RCPTR MODLG ANTB: CPT

## 2024-09-13 PROCEDURE — 83921 ORGANIC ACID SINGLE QUANT: CPT

## 2024-09-13 PROCEDURE — 84443 ASSAY THYROID STIM HORMONE: CPT

## 2024-09-13 PROCEDURE — 86041 ACETYLCHOLN RCPTR BNDNG ANTB: CPT

## 2024-09-13 PROCEDURE — 82746 ASSAY OF FOLIC ACID SERUM: CPT

## 2024-09-13 PROCEDURE — 82607 VITAMIN B-12: CPT

## 2024-09-13 RX ORDER — TRAMADOL HYDROCHLORIDE 50 MG/1
50 TABLET ORAL EVERY 12 HOURS PRN
COMMUNITY

## 2024-09-13 NOTE — PROGRESS NOTES
"   Neuro Office Visit      Encounter Date: 2024   Patient Name: Shelly Stevens  : 1950   MRN: [id number]   PCP:  Sriram Morris APRN     Chief Complaint:    Chief Complaint   Patient presents with    Memory Loss       History of Present Illness: Shelly Stevens is a 73 y.o. female who is here today in Neurology for  memory loss    PMH of spinal stenosis of lumbar region with neurogenic claudication L3-4, history of vasovagal syncope, class I obesity, fibromyalgia, hyperglycemia, metabolic syndrome, chronic fatigue, hypothyroidism, hypercholesterolemia, hypertension, chronic bilateral low back pain with bilateral sciatica, atrial tachycardia      Shelly Stevens is a 73 y.o. female who comes to clinic today for evaluation of memory loss . She first noted memory/cognitive changes when she had difficulty operating the Spin[employer] in her own vehicle. She will sometimes leave items on the stove, but has now been leaving a light on over the stove so that she remembers to turn it off. She feels that memory changes are very mild overall. She feels that sometimes her mouth will \"jerk\" when she is on the phone. She has noted symptoms since at least 2 years marked initially by forgetfulness . This has gradually worsened  over time. Additional symptoms have included impairments in short term memory . There have been associated  symptoms of anxiety. She denies  impairments in ADL's. She manages her medications  and finances. She continues to drive.  She is currently residing at home.     Family History: No neurologic conditions in the family   Personal Neurological History: Previously saw Dr. Macedo in  and was diagnosed with functional movement disorder  Education & Work History:College education   Psychological History: Anxiety  Sleep Habits & History: Poor sleep  Chronic Pain: Fibromyalgia      She reports that she feels that weakness makes it difficult to walk at times, no assistive device, she " has difficulty swallowing at times - sees Dr. Sanchez - feels that food goes down before she is ready at times. She takes Dexilant to aid with acid reflux. She has history of blepharospasm. She reports that sometimes she has difficulty opening her eyes at times. She will sometimes accidentally have mouth open. She has trouble walking at times and will sometimes loose her balance. Gait unsteady at times, rare falls.     She notes a sensation of her stomach shaking - resolves when she pushes on the back of her neck, has found that when she is nervous she will shake, she also states that she can be easily frustrated/irritable at times.    Of note she was evaluated by Dr. Macedo in 2022 and it was felt at that time that her tremors were due to a functional movement disorder given that they were triggered by anxiety/stress and basal ganglia and mid brain were normal on MRI, no synkinesis or other eyelid myoclonus.     She also reports that she previously had COVID-19 and feels that many of her symptoms developed after COVID.     Subjective      Review of Systems   HENT:  Positive for trouble swallowing.    Eyes: Negative.    Respiratory: Negative.     Gastrointestinal:         Feels that her stomach is shaking at times   Musculoskeletal:  Positive for arthralgias and neck pain.   Skin: Negative.    Allergic/Immunologic:        Latex allergy   Neurological:  Positive for tremors and weakness.        Memory loss   Psychiatric/Behavioral:  Positive for agitation and sleep disturbance. The patient is nervous/anxious.           Past Medical History:   Past Medical History:   Diagnosis Date    Anemia     Anxiety     Asthma     Back problem     Bronchitis     Claustrophobia     COVID-19 07/2021    Depression     Headache     HL (hearing loss)     Mononucleosis 05/29/2023    Pre-diabetes     Thyroid disease     Tremors of nervous system        Past Surgical History:   Past Surgical History:   Procedure Laterality Date     CARDIOVASCULAR STRESS TEST  2022    4 Min. 7.00 METS. 91% THR. 215/74. EF 56%. Breast attenuation    CARDIOVASCULAR STRESS TEST  05/10/2024    Lexiscan- EF > 70%. Negative    CONVERTED (HISTORICAL) HOLTER  2021    -11 Days. Avg 64. . 162 SVT    CONVERTED (HISTORICAL) HOLTER  06/10/2024    < 10 Days. Avg 67. . 143 SVT    CONVERTED (HISTORICAL) HOLTER  2024    7 Days. AVG 67. . 65 SVT    ECHO - CONVERTED  06/15/2022    EF 65%. Trace-Mild MR & AI. RVSP- 27 mmHg    ECHO - CONVERTED  05/10/2024    EF 70%. Mild MR & AI. RVSP- 31 mmHg    GALLBLADDER SURGERY      HYSTERECTOMY      OTHER SURGICAL HISTORY  2023    Pulse O2- Borderline    SPHENOIDECTOMY      TONSILLECTOMY         Family History:   Family History   Problem Relation Age of Onset    COPD Mother     Asthma Mother     Arthritis Mother     Hyperlipidemia Mother     Heart attack Maternal Grandfather     No Known Problems Son     No Known Problems Son     Seizures Daughter        Social History:   Social History     Socioeconomic History    Marital status:    Tobacco Use    Smoking status: Former     Current packs/day: 0.00     Types: Cigarettes     Quit date:      Years since quittin.7     Passive exposure: Never    Smokeless tobacco: Never   Vaping Use    Vaping status: Never Used    Passive vaping exposure: Yes   Substance and Sexual Activity    Alcohol use: Never    Drug use: Never    Sexual activity: Defer       Medications:     Current Outpatient Medications:     albuterol (ProAir RespiClick) 108 (90 Base) MCG/ACT inhaler, 1 puff Every 6 (Six) Hours As Needed., Disp: , Rfl:     Asmanex, 30 Metered Doses, 220 MCG/ACT inhaler, 1 puff As Needed., Disp: , Rfl:     bisoprolol (ZEBeta) 5 MG tablet, Take 0.5 tablets by mouth Daily., Disp: 30 tablet, Rfl: 6    cycloSPORINE (RESTASIS) 0.05 % ophthalmic emulsion, , Disp: , Rfl:     dexlansoprazole (DEXILANT) 60 MG capsule, Daily As Needed., Disp: , Rfl:  "    hydroCHLOROthiazide 12.5 MG tablet, Take 1 tablet by mouth Daily As Needed (elevated BP)., Disp: 30 tablet, Rfl: 1    levothyroxine (SYNTHROID, LEVOTHROID) 75 MCG tablet, Take 1 tablet by mouth Daily., Disp: , Rfl:     montelukast (SINGULAIR) 10 MG tablet, Take 1 tablet by mouth Every Night., Disp: , Rfl:     multivitamin with minerals tablet tablet, Take 1 tablet by mouth Daily., Disp: , Rfl:     olmesartan (BENICAR) 20 MG tablet, Take 1 tablet by mouth Daily As Needed (qhs)., Disp: 90 tablet, Rfl: 3    Omega-3 Fatty Acids (fish oil) 1000 MG capsule capsule, Take  by mouth Daily With Breakfast., Disp: , Rfl:     terconazole (TERAZOL 3) 0.8 % vaginal cream, , Disp: , Rfl:     triamcinolone (KENALOG) 0.1 % cream, As Needed., Disp: , Rfl:     Triamcinolone Acetonide (NASACORT) 55 MCG/ACT nasal inhaler, 2 sprays into the nostril(s) as directed by provider Daily As Needed for Rhinitis., Disp: , Rfl:     famotidine (PEPCID) 20 MG tablet, At Night As Needed., Disp: , Rfl:     Inclisiran Sodium 284 MG/1.5ML solution prefilled syringe, Inject 1.5 mL under the skin into the appropriate area as directed Every 3 (Three) Months., Disp: 1.5 mL, Rfl: 1    traMADol (ULTRAM) 50 MG tablet, Take 1 tablet by mouth Every 12 (Twelve) Hours As Needed for Moderate Pain., Disp: , Rfl:     Allergies:   Allergies   Allergen Reactions    Atorvastatin Myalgia    Crestor [Rosuvastatin] Myalgia    Livalo [Pitavastatin] Myalgia    Simvastatin Myalgia    Latex Itching    Gentamicin Palpitations     Other reaction(s): VOMITING         STEADI Fall Risk Assessment was completed, and patient is at HIGH risk for falls. Assessment completed on:9/13/2024    Objective     Objective:    /84   Pulse 57   Ht 160 cm (63\")   Wt 78.9 kg (174 lb)   SpO2 97%   BMI 30.82 kg/m²   Body mass index is 30.82 kg/m².    Physical Exam  Vitals reviewed.   Constitutional:       Appearance: Normal appearance.   HENT:      Head: Normocephalic and atraumatic.    "   Mouth/Throat:      Mouth: Mucous membranes are moist.      Pharynx: Oropharynx is clear.   Pulmonary:      Effort: Pulmonary effort is normal. No respiratory distress.   Musculoskeletal:      Right lower leg: No edema.      Left lower leg: No edema.   Neurological:      Mental Status: She is alert.          Neurology Exam:    General apperance: NAD.     Mental status: Alert, awake and oriented to time place and person.    Fund of knowledge:  Normal.     Language and Speech: No aphasia or dysarthria.    Naming , Repetition and Comprehension:  Can name objects, repeat a sentence and follow commands. Speech is clear and fluent with good repetition, comprehension, and naming.    Cranial Nerves:   CN II: Visual fields are full. Intact. Pupils - PERRLA  CN III, IV and VI: Extraocular movements are intact. Normal saccades.   CN V: Facial sensation is intact.   CN VII: Muscles of facial expression reveal no asymmetry. Intact.   CN VIII: Hearing is intact.   CN IX and X: Palate elevates symmetrically. Intact  CN XI: Shoulder shrug is intact.   CN XII: Tongue is midline without evidence of atrophy or fasciculation.     Motor:  Right UE muscle strength 5/5. Normal tone.     Left UE muscle strength 5/5. Normal tone.      Right LE muscle strength 5/5. Normal tone.     Left LE muscle strength 5/5. Normal tone.      No resting tremor of BUE  No action tremor of BUE  No cogwheeling or rigidity    Sensory: Normal light touch sensation bilaterally.    DTRs: 2+ bilaterally in upper and lower extremities.    Coordination: Normal finger-to-nose    Gait: Normal. No assistive device, no tremor while walking    MMSE 29/30, recall 3/3      Results:   Imaging:   No Images in the past 120 days found..     Labs:         Assessment / Plan      Assessment/Plan:   Diagnoses and all orders for this visit:    1. Memory loss (Primary)  -     Vitamin B12; Future  -     Folate; Future  -     Methylmalonic Acid, Serum; Future  -     TSH; Future  -      T4, free; Future  -     RPR Qualitative with Reflex to Quant; Future  -     Cancel: MRI Brain Without Contrast; Future  -     MRI Brain Without Contrast; Future    2. Tremor  -     Vitamin B12; Future  -     Folate; Future  -     Methylmalonic Acid, Serum; Future  -     TSH; Future  -     T4, free; Future  -     RPR Qualitative with Reflex to Quant; Future  -     Cancel: MRI Brain Without Contrast; Future  -     MRI Brain Without Contrast; Future    3. Generalized weakness  -     Acetylcholine Receptor Antibody Panel; Future  -     Musk Antibody; Future       Shelly Stevens is in neurology clinic to establish care for memory loss, tremors, and generalized weakness. MMSE today 29/30, recall 3/3. Will further screen memory loss with baseline labs and MRI brain. Tremors not reproducible today on exam, exam not consistent with Parkinson's disease as there is no resting tremor, no cogwheeling/rigidity, no bradykinesia, gait is appropriate, arm swing is equal, no shuffling. Previously seen by Dr. Macedo and diagnosed with functional movement disorder. She also has history of blepharospasm, if this becomes more bothersome could consider botox. Muscle strength testing appropriate on exam, however given concern for trouble swallowing without clear cause identified by ENT and generalized weakness I have ordered acetylcholine receptor antibody panel plus MUSK antibody. Will plan to see Shelly back in clinic in 8 weeks or sooner for any questions or concerns.     Patient Education:       Reviewed medications, potential side effects and signs and symptoms to report. Discussed risk versus benefits of treatment plan with patient and/or family-including medications, labs and radiology that may be ordered. Addressed questions and concerns during visit. Patient and/or family verbalized understanding and agree with plan. Instructed to call the office with any questions and report to ER with any life-threatening symptoms.     Follow  Up:   Return in about 8 weeks (around 11/8/2024).    I spent  43  minutes in the care of this patient. I personally spent 50 percent of this time counseling and discussing diagnostic testing and evaluation .       During this visit the following were done:  Labs Reviewed []    Labs Ordered [x]    Radiology Reports Reviewed []    Radiology Ordered []    PCP Records Reviewed []    Referring Provider Records Reviewed []    ER Records Reviewed []    Hospital Records Reviewed []    History Obtained From Family []    Radiology Images Reviewed []    Other Reviewed []    Records Requested []      TIARA Stanley  [facility]  [address]  [place] [address] [phone]

## 2024-09-14 LAB
FOLATE SERPL-MCNC: >20 NG/ML (ref 4.78–24.2)
RPR SER QL: NORMAL
T4 FREE SERPL-MCNC: 1.49 NG/DL (ref 0.92–1.68)
VIT B12 BLD-MCNC: 652 PG/ML (ref 211–946)

## 2024-09-16 ENCOUNTER — OFFICE VISIT (OUTPATIENT)
Dept: CARDIOLOGY | Facility: CLINIC | Age: 74
End: 2024-09-16
Payer: MEDICARE

## 2024-09-16 VITALS
WEIGHT: 172 LBS | BODY MASS INDEX: 30.48 KG/M2 | HEART RATE: 60 BPM | DIASTOLIC BLOOD PRESSURE: 70 MMHG | OXYGEN SATURATION: 98 % | HEIGHT: 63 IN | SYSTOLIC BLOOD PRESSURE: 112 MMHG

## 2024-09-16 DIAGNOSIS — I47.19 ATRIAL TACHYCARDIA: Primary | ICD-10-CM

## 2024-09-16 PROCEDURE — 99204 OFFICE O/P NEW MOD 45 MIN: CPT | Performed by: INTERNAL MEDICINE

## 2024-09-16 PROCEDURE — 3074F SYST BP LT 130 MM HG: CPT | Performed by: INTERNAL MEDICINE

## 2024-09-16 PROCEDURE — G2211 COMPLEX E/M VISIT ADD ON: HCPCS | Performed by: INTERNAL MEDICINE

## 2024-09-16 PROCEDURE — 3078F DIAST BP <80 MM HG: CPT | Performed by: INTERNAL MEDICINE

## 2024-09-18 ENCOUNTER — TELEPHONE (OUTPATIENT)
Dept: NEUROLOGY | Facility: CLINIC | Age: 74
End: 2024-09-18
Payer: MEDICARE

## 2024-09-18 LAB — METHYLMALONATE SERPL-SCNC: 162 NMOL/L (ref 0–378)

## 2024-09-23 ENCOUNTER — TELEPHONE (OUTPATIENT)
Dept: NEUROLOGY | Facility: CLINIC | Age: 74
End: 2024-09-23

## 2024-09-23 LAB
ACHR BIND AB SER-SCNC: <0.03 NMOL/L (ref 0–0.24)
ACHR BLOCK AB SER-ACNC: 20 % (ref 0–25)
ACHR MOD AB SER QL FC: 0 % (ref 0–45)

## 2024-09-23 NOTE — TELEPHONE ENCOUNTER
Caller: Shelly Stevens    Relationship: Self    Best call back number:   Telephone Information:   Mobile [phone]   A VM CAN BE LEFT WITH HIPPA    What orders are you requesting (i.e. lab or imaging): MRI Brain Without Contrast     Where will you receive your lab/imaging services: [facility]  Phone: [phone]      Additional notes: PLEAS RE-ROUTE ORDER TO ABOVE LOCATION       CAN SOMEONE PLEASE ALSO REACH OUT REGARDING LAB RESULTS.

## 2024-09-23 NOTE — TELEPHONE ENCOUNTER
----- Message from Lenora Vargas sent at 9/23/2024  3:56 PM EDT -----  Please notify Shelly that her acetylcholine receptor antibody panel was negative - this is part of the myasthenia gravis lab work, there is another lab pending  (MUSK antibody) as well that should result soon.

## 2024-09-23 NOTE — TELEPHONE ENCOUNTER
Patient stated that she is not having allergic reaction that she has been going through this for quite a while now and that Dr. Chatman has checked her for Sjogren's and she is trying to get a hold of Dr. Chatman to get another test done.  She does not need speech therapy as she feels she will not benefit due to its her mouth being dry that causes her tongue to get irritated and swell.

## 2024-09-23 NOTE — TELEPHONE ENCOUNTER
Notified Shelly of results and she is in good understanding.  She asked if she still needed MRI if all comes back negative and per Lenora I told her yes. She appreciated the return call.      Also, she is still having trouble swallowing.  She said her mouth gets dry and her tongue swells up and it affects her speech.

## 2024-09-24 LAB — MUSK AB SER IA-ACNC: <1 U/ML

## 2024-09-30 ENCOUNTER — TELEPHONE (OUTPATIENT)
Dept: NEUROLOGY | Facility: CLINIC | Age: 74
End: 2024-09-30
Payer: MEDICARE

## 2024-09-30 NOTE — TELEPHONE ENCOUNTER
----- Message from Lenora Vargas sent at 9/25/2024  3:45 PM EDT -----  Please notify Shelly that her MUSK Antibody was negative for myasthenia gravis. Both the acetylcholine receptor antibody and MUSK Antibody were negative.

## 2024-10-02 NOTE — TELEPHONE ENCOUNTER
Patient was thrilled to received lab results.      She asked if still needed MRI and per Lenora she does.  I told her order was still pending and once authorized by insurance someone will contact her for scheduling.  She would like it done at [facility] in [place].

## 2024-10-07 ENCOUNTER — TELEPHONE (OUTPATIENT)
Dept: NEUROLOGY | Facility: CLINIC | Age: 74
End: 2024-10-07
Payer: MEDICARE

## 2024-10-07 NOTE — TELEPHONE ENCOUNTER
CALLED PATIENT TO LET HER KNOW OF HER MRI BRAIN APPOINTMENT ON 10/18/24 AT 11:30AM AT [facility]

## 2024-10-22 ENCOUNTER — TELEPHONE (OUTPATIENT)
Dept: NEUROLOGY | Facility: CLINIC | Age: 74
End: 2024-10-22
Payer: MEDICARE

## 2024-10-22 NOTE — TELEPHONE ENCOUNTER
Caller: Shelly Stevens    Relationship: Self    Best call back number:   Telephone Information:   Mobile [phone]   OK TO [facility] IF NO ANSWER    Caller requesting test results: SELF    What test was performed:   MRI OF THE BRAIN    When was the test performed:   10-18-24    Where was the test performed:   [facility] [phone]    Additional notes:  PT IS ASKING IF SHE NEEDS TO GET THE DISC OR WILL THE TEST RESULTS BE ENOUGH?    PLEASE CALL PT TO LET HER KNOW EITHER WAY.

## 2024-11-06 ENCOUNTER — OFFICE VISIT (OUTPATIENT)
Dept: CARDIOLOGY | Facility: CLINIC | Age: 74
End: 2024-11-06
Payer: MEDICARE

## 2024-11-06 ENCOUNTER — DOCUMENTATION (OUTPATIENT)
Dept: NEUROLOGY | Facility: CLINIC | Age: 74
End: 2024-11-06
Payer: MEDICARE

## 2024-11-06 VITALS
HEART RATE: 73 BPM | HEIGHT: 63 IN | DIASTOLIC BLOOD PRESSURE: 76 MMHG | RESPIRATION RATE: 18 BRPM | SYSTOLIC BLOOD PRESSURE: 121 MMHG | BODY MASS INDEX: 30.72 KG/M2 | OXYGEN SATURATION: 96 % | WEIGHT: 173.4 LBS

## 2024-11-06 DIAGNOSIS — E78.00 HYPERCHOLESTEROLEMIA: ICD-10-CM

## 2024-11-06 DIAGNOSIS — R06.02 SOB (SHORTNESS OF BREATH): ICD-10-CM

## 2024-11-06 DIAGNOSIS — E55.9 VITAMIN D DEFICIENCY: ICD-10-CM

## 2024-11-06 DIAGNOSIS — R73.9 HYPERGLYCEMIA: ICD-10-CM

## 2024-11-06 DIAGNOSIS — I47.10 PAROXYSMAL SVT (SUPRAVENTRICULAR TACHYCARDIA): ICD-10-CM

## 2024-11-06 DIAGNOSIS — I47.19 ATRIAL TACHYCARDIA: Primary | ICD-10-CM

## 2024-11-06 DIAGNOSIS — I10 PRIMARY HYPERTENSION: ICD-10-CM

## 2024-11-06 PROCEDURE — 1160F RVW MEDS BY RX/DR IN RCRD: CPT | Performed by: NURSE PRACTITIONER

## 2024-11-06 PROCEDURE — 99214 OFFICE O/P EST MOD 30 MIN: CPT | Performed by: NURSE PRACTITIONER

## 2024-11-06 PROCEDURE — 1159F MED LIST DOCD IN RCRD: CPT | Performed by: NURSE PRACTITIONER

## 2024-11-06 PROCEDURE — 3074F SYST BP LT 130 MM HG: CPT | Performed by: NURSE PRACTITIONER

## 2024-11-06 PROCEDURE — 3078F DIAST BP <80 MM HG: CPT | Performed by: NURSE PRACTITIONER

## 2024-11-06 RX ORDER — IBUPROFEN 800 MG/1
800 TABLET, FILM COATED ORAL EVERY 6 HOURS PRN
COMMUNITY
Start: 2024-10-10

## 2024-11-06 NOTE — Clinical Note
Please let Shelly know her MRI brain results, we can further review at her next visit with me on 11/14/2024, please let me know if she has any questions prior to her visit, thank you!

## 2024-11-06 NOTE — PROGRESS NOTES
Chief Complaint   Patient presents with    Follow-up     Cardiac Management   Pt states her heart races if she doesn't get enough sleep, but states its not as bad as it was.     Med Refill     90 Days    Labs Only     09/13     Subjective       Shelly Stevens is a 73 y.o. female with HTN, hypothyroidism, hypercholesterolemia and SVT diagnosed in 2021 by EP after having near syncopal episode. Echo showed normal LVEF.  Also felt she had vasovagal episodes and beta-blocker not added.  HCTZ and ARB started, later reported side effects. She takes medications as needed due to labile nature of blood pressure.  She is statin intolerant.  .  She returned May 2024 for follow-up with multiple complaints.  Stress test, echocardiogram, and Holter monitor repeated.  No ischemia, normal LVEF, mild MR and AI noted.  143 runs of SVT.  We tried flecainide but she did not tolerate.  She was referred to EP for opinion.    She returns today for follow-up.  She apparently underwent cardiopulmonary stress test at , reported she had atrial fibrillation throughout the testing.  Strips unavailable.  Dr. Villalobos following up on this is a mild 100%            EKG strips indciating afib during stress   stress lab    Did not tolerate Leqvio          Cardiac History:    Past Surgical History:   Procedure Laterality Date    CARDIOVASCULAR STRESS TEST  06/24/2022    4 Min. 7.00 METS. 91% THR. 215/74. EF 56%. Breast attenuation    CARDIOVASCULAR STRESS TEST  05/10/2024    Lexiscan- EF > 70%. Negative    CONVERTED (HISTORICAL) HOLTER  02/23/2021    -11 Days. Avg 64. . 162 SVT    CONVERTED (HISTORICAL) HOLTER  06/10/2024    < 10 Days. Avg 67. . 143 SVT    CONVERTED (HISTORICAL) HOLTER  08/09/2024    7 Days. AVG 67. . 65 SVT    ECHO - CONVERTED  06/15/2022    EF 65%. Trace-Mild MR & AI. RVSP- 27 mmHg    ECHO - CONVERTED  05/10/2024    EF 70%. Mild MR & AI. RVSP- 31 mmHg    GALLBLADDER SURGERY       HYSTERECTOMY      OTHER SURGICAL HISTORY  11/30/2023    Pulse O2- Borderline    SPHENOIDECTOMY      TONSILLECTOMY       Current Outpatient Medications   Medication Sig Dispense Refill    albuterol (ProAir RespiClick) 108 (90 Base) MCG/ACT inhaler 1 puff Every 6 (Six) Hours As Needed.      Asmanex, 30 Metered Doses, 220 MCG/ACT inhaler 1 puff As Needed.      cycloSPORINE (RESTASIS) 0.05 % ophthalmic emulsion       dexlansoprazole (DEXILANT) 60 MG capsule Daily As Needed.      hydroCHLOROthiazide 12.5 MG tablet Take 1 tablet by mouth Daily As Needed (elevated BP). 30 tablet 1    ibuprofen (ADVIL,MOTRIN) 800 MG tablet Take 1 tablet by mouth Every 6 (Six) Hours As Needed for Moderate Pain.      levothyroxine (SYNTHROID, LEVOTHROID) 75 MCG tablet Take 88 mcg by mouth Daily.      montelukast (SINGULAIR) 10 MG tablet Take 1 tablet by mouth Every Night.      multivitamin with minerals tablet tablet Take 1 tablet by mouth Daily.      olmesartan (BENICAR) 20 MG tablet Take 1 tablet by mouth Daily As Needed (qhs). 90 tablet 3    Omega-3 Fatty Acids (fish oil) 1000 MG capsule capsule Take  by mouth Daily With Breakfast.      terconazole (TERAZOL 3) 0.8 % vaginal cream       traMADol (ULTRAM) 50 MG tablet Take 1 tablet by mouth Every 12 (Twelve) Hours As Needed for Moderate Pain.      triamcinolone (KENALOG) 0.1 % cream As Needed.      bisoprolol (ZEBeta) 5 MG tablet Take 0.5 tablets by mouth Daily. (Patient not taking: Reported on 11/6/2024) 30 tablet 6    Inclisiran Sodium 284 MG/1.5ML solution prefilled syringe Inject 1.5 mL under the skin into the appropriate area as directed Every 3 (Three) Months. (Patient not taking: Reported on 11/6/2024) 1.5 mL 1     No current facility-administered medications for this visit.       Atorvastatin, Crestor [rosuvastatin], Livalo [pitavastatin], Simvastatin, Latex, and Gentamicin    Past Medical History:   Diagnosis Date    Anemia     Anxiety     Asthma     Back problem     Bronchitis      "Claustrophobia     COVID-19 2021    Depression     Headache     HL (hearing loss)     Mononucleosis 2023    Pre-diabetes     Thyroid disease     Tremors of nervous system        Social History     Socioeconomic History    Marital status:    Tobacco Use    Smoking status: Former     Current packs/day: 0.00     Types: Cigarettes     Quit date:      Years since quittin.8     Passive exposure: Never    Smokeless tobacco: Never   Vaping Use    Vaping status: Never Used    Passive vaping exposure: Yes   Substance and Sexual Activity    Alcohol use: Never    Drug use: Never    Sexual activity: Defer       Family History   Problem Relation Age of Onset    COPD Mother     Asthma Mother     Arthritis Mother     Hyperlipidemia Mother     Heart attack Maternal Grandfather     No Known Problems Son     No Known Problems Son     Seizures Daughter        Review of Systems   Constitutional: Negative for decreased appetite and malaise/fatigue.   HENT: Negative.     Eyes:  Negative for blurred vision.   Cardiovascular:  Negative for chest pain, dyspnea on exertion, leg swelling, palpitations and syncope.   Respiratory:  Negative for shortness of breath and sleep disturbances due to breathing.    Endocrine: Negative.    Hematologic/Lymphatic: Negative for bleeding problem. Does not bruise/bleed easily.   Skin: Negative.    Musculoskeletal:  Negative for falls and myalgias.   Gastrointestinal:  Negative for abdominal pain, heartburn and melena.   Genitourinary:  Negative for hematuria.   Neurological:  Negative for dizziness and light-headedness.   Psychiatric/Behavioral:  Negative for altered mental status.    Allergic/Immunologic: Negative.         Objective     /76 (BP Location: Left arm, Patient Position: Sitting, Cuff Size: Adult)   Pulse 73   Resp 18   Ht 160 cm (62.99\")   Wt 78.7 kg (173 lb 6.4 oz)   SpO2 96%   BMI 30.72 kg/m²     Vitals and nursing note reviewed.   Constitutional:       " General: Not in acute distress.     Appearance: Well-developed. Not diaphoretic.   Eyes:      Pupils: Pupils are equal, round, and reactive to light.   HENT:      Head: Normocephalic.   Pulmonary:      Effort: Pulmonary effort is normal. No respiratory distress.      Breath sounds: Normal breath sounds.   Cardiovascular:      Normal rate. Regular rhythm.   Pulses:     Intact distal pulses.   Edema:     Peripheral edema absent.   Abdominal:      General: Bowel sounds are normal.      Palpations: Abdomen is soft.   Musculoskeletal: Normal range of motion.      Cervical back: Normal range of motion. Skin:     General: Skin is warm and dry.   Neurological:      Mental Status: Alert and oriented to person, place, and time.          Procedures          Problem List Items Addressed This Visit          Cardiac and Vasculature    Hypercholesterolemia    Relevant Orders    Comprehensive Metabolic Panel    Lipid Panel    TSH    CBC (No Diff)    Magnesium    Hemoglobin A1c    Primary hypertension    Relevant Orders    Comprehensive Metabolic Panel    Lipid Panel    TSH    CBC (No Diff)    Magnesium    Atrial tachycardia - Primary    Overview     Holter monitor Edda 10, 2024 multiple episodes of tacky palpitations with atrial tachycardia correlating with symptoms  Repeat monitor August 9, 2024 reduction in tachypalpitations, fewer atrial tachycardia episodes         Relevant Orders    Comprehensive Metabolic Panel    Lipid Panel    TSH    CBC (No Diff)    Magnesium    Hemoglobin A1c    T4       Endocrine and Metabolic    Hyperglycemia    Relevant Orders    Hemoglobin A1c     Other Visit Diagnoses       Paroxysmal SVT (supraventricular tachycardia)        Relevant Orders    Comprehensive Metabolic Panel    Lipid Panel    TSH    CBC (No Diff)    Magnesium    Hemoglobin A1c    T4    SOB (shortness of breath)        Relevant Orders    Comprehensive Metabolic Panel    CBC (No Diff)    Vitamin D deficiency        Relevant Orders     Calcitriol (1,25 di-OH Vitamin D)                             Electronically signed by TIARA Pastrana,  November 11, 2024 08:17 EST

## 2024-11-06 NOTE — PROGRESS NOTES
MRI brain performed on 10/18/2024 -per report no acute intracranial pathology noted.  Mild deep white matter disease consistent with small vessel ischemic changes in this age group.

## 2024-11-12 ENCOUNTER — TELEPHONE (OUTPATIENT)
Dept: NEUROLOGY | Facility: CLINIC | Age: 74
End: 2024-11-12
Payer: MEDICARE

## 2024-11-12 NOTE — TELEPHONE ENCOUNTER
MRI brain performed on 10/18/2024 -per report no acute intracranial pathology noted.  Mild deep white matter disease consistent with small vessel ischemic changes in this age group. we can further review at her next visit with me on 11/14/2024       No answer-    HUB:  please transfer to [facility]

## 2024-11-12 NOTE — TELEPHONE ENCOUNTER
----- Message from Lenora Vargas sent at 11/6/2024  4:54 PM EST -----  Please let Shelly know her MRI brain results, we can further review at her next visit with me on 11/14/2024, please let me know if she has any questions prior to her visit, thank you!

## 2024-11-14 ENCOUNTER — OFFICE VISIT (OUTPATIENT)
Dept: NEUROLOGY | Facility: CLINIC | Age: 74
End: 2024-11-14
Payer: MEDICARE

## 2024-11-14 VITALS
DIASTOLIC BLOOD PRESSURE: 80 MMHG | HEART RATE: 55 BPM | HEIGHT: 63 IN | SYSTOLIC BLOOD PRESSURE: 120 MMHG | WEIGHT: 173.5 LBS | BODY MASS INDEX: 30.74 KG/M2 | OXYGEN SATURATION: 97 %

## 2024-11-14 DIAGNOSIS — R25.1 TREMOR: ICD-10-CM

## 2024-11-14 DIAGNOSIS — R41.3 MEMORY LOSS: Primary | ICD-10-CM

## 2024-11-14 DIAGNOSIS — R53.1 GENERALIZED WEAKNESS: ICD-10-CM

## 2024-11-14 NOTE — Clinical Note
Med pended Will you please let Shelly know that Dr. More reviewed her MRI brain as well and he does not feel the white matter changes are suggestive of MS.

## 2024-11-14 NOTE — PROGRESS NOTES
"   Neuro Office Visit      Encounter Date: 2024   Patient Name: Shelly Stevens  : 1950   MRN: [id number]   PCP:  Sriram Morris APRN     Chief Complaint:    Chief Complaint   Patient presents with    Memory Loss       History of Present Illness: Shelly Stevens is a 73 y.o. female who is here today in Neurology for  memory loss    2024:  PMH of spinal stenosis of lumbar region with neurogenic claudication L3-4, history of vasovagal syncope, class I obesity, fibromyalgia, hyperglycemia, metabolic syndrome, chronic fatigue, hypothyroidism, hypercholesterolemia, hypertension, chronic bilateral low back pain with bilateral sciatica, atrial tachycardia      Shelly Stevens is a 73 y.o. female who comes to clinic today for evaluation of memory loss . She first noted memory/cognitive changes when she had difficulty operating the [employer] in her own vehicle. She will sometimes leave items on the stove, but has now been leaving a light on over the stove so that she remembers to turn it off. She feels that memory changes are very mild overall. She feels that sometimes her mouth will \"jerk\" when she is on the phone. She has noted symptoms since at least 2 years marked initially by forgetfulness . This has gradually worsened  over time. Additional symptoms have included impairments in short term memory . There have been associated  symptoms of anxiety. She denies  impairments in ADL's. She manages her medications  and finances. She continues to drive.  She is currently residing at home.     Family History: No neurologic conditions in the family   Personal Neurological History: Previously saw Dr. Macedo in  and was diagnosed with functional movement disorder  Education & Work History:College education   Psychological History: Anxiety  Sleep Habits & History: Poor sleep  Chronic Pain: Fibromyalgia    She reports that she feels that weakness makes it difficult to walk at " times, no assistive device, she has difficulty swallowing at times - sees Dr. Sanchez - feels that food goes down before she is ready at times. She takes Dexilant to aid with acid reflux. She has history of blepharospasm. She reports that sometimes she has difficulty opening her eyes at times. She will sometimes accidentally have mouth open. She has trouble walking at times and will sometimes loose her balance. Gait unsteady at times, rare falls.     She notes a sensation of her stomach shaking - resolves when she pushes on the back of her neck, has found that when she is nervous she will shake, she also states that she can be easily frustrated/irritable at times.    Of note she was evaluated by Dr. Macedo in 2022 and it was felt at that time that her tremors were due to a functional movement disorder given that they were triggered by anxiety/stress and basal ganglia and mid brain were normal on MRI, no synkinesis or other eyelid myoclonus.     She also reports that she previously had COVID-19 and feels that many of her symptoms developed after COVID.       Current visit 11/14/2024:  Shelly Stevens returns to neurology clinic for continued evaluation of memory loss, tremors, and generalized weakness. Vitamin B12 652, folate was greater than 20, methylmalonic acid 162, TSH 0.698, free T4 was 1.49, acetylcholine receptor antibody panel was negative, MuSK antibody was negative, RPR was nonreactive.  MRI brain performed on 10/18/2024 showed no acute intracranial pathology.  Mild deep white matter disease consistent with small vessel ischemic changes.    She notes fluctuating weakness, when she closes her eyes it is hard to open them again, Will sometimes jerk in the bed at night and feels like her hands will over react at times, she reports that at times that she has difficulty swallowing and will sometimes have food go down before she is ready for it to.     She feels like memory has improved some, she frequently  "writes things down, she does find that she will sometimes do \"silly things\" such as putting items that don't belong in the refrigerator. She notes that her sleep is poor due to back pain. She has found that she doesn't enjoy reading like she previously did because she has to keep re-reading the same sentence. She finds that word retrieval is more difficult, she notes that this started after COVID. Increased difficulty with filling out bills. She has seen improvement over time but it has been slow.     ATN Profile - per report a normal beta-amyloid 42/40 ratio and normal concentrations of [id number] and NfL were observed at this time, these results are not consistent with the presence of Alzheimer's related pathology.   A - Beta-amyloid 42/40 ratio was 0.120  Beta-amyloid 42 was 19.39  Beta-amyloid 40 was 162.19  T - p-tau 181 was 0.68  N - NfL, Plasma was 2.75    Neurofilament light chain normal at 2.72  Uric Acid normal at 6.1    Reports ongoing back and hip pain - she did previously see Dr. Man - she has progressive symptoms of low back pain with bilateral sciatica and probable neurogenic claudication.  Dr. Man had recommended a lumbar myelogram.  She did not end up getting this procedure done.  She notes chronic low back pain, she is willing to consider following back up with neurosurgery.    Subjective      Review of Systems   HENT:  Positive for trouble swallowing.    Eyes: Negative.    Respiratory: Negative.     Gastrointestinal:         Feels that her stomach is shaking at times   Musculoskeletal:  Positive for arthralgias and neck pain.   Skin: Negative.    Allergic/Immunologic:        Latex allergy   Neurological:  Positive for tremors and weakness.        Memory loss   Psychiatric/Behavioral:  Positive for agitation and sleep disturbance. The patient is nervous/anxious.           Past Medical History:   Past Medical History:   Diagnosis Date    Anemia     Anxiety     Asthma     Back problem     " Bronchitis     Claustrophobia     COVID-19 2021    Depression     Headache     HL (hearing loss)     Mononucleosis 2023    Pre-diabetes     Thyroid disease     Tremors of nervous system        Past Surgical History:   Past Surgical History:   Procedure Laterality Date    CARDIOVASCULAR STRESS TEST  2022    4 Min. 7.00 METS. 91% THR. 215/74. EF 56%. Breast attenuation    CARDIOVASCULAR STRESS TEST  05/10/2024    Lexiscan- EF > 70%. Negative    CONVERTED (HISTORICAL) HOLTER  2021    -11 Days. Avg 64. . 162 SVT    CONVERTED (HISTORICAL) HOLTER  06/10/2024    < 10 Days. Avg 67. . 143 SVT    CONVERTED (HISTORICAL) HOLTER  2024    7 Days. AVG 67. . 65 SVT    ECHO - CONVERTED  06/15/2022    EF 65%. Trace-Mild MR & AI. RVSP- 27 mmHg    ECHO - CONVERTED  05/10/2024    EF 70%. Mild MR & AI. RVSP- 31 mmHg    GALLBLADDER SURGERY      HYSTERECTOMY      OTHER SURGICAL HISTORY  2023    Pulse O2- Borderline    SPHENOIDECTOMY      TONSILLECTOMY         Family History:   Family History   Problem Relation Age of Onset    COPD Mother     Asthma Mother     Arthritis Mother     Hyperlipidemia Mother     Heart attack Maternal Grandfather     No Known Problems Son     No Known Problems Son     Seizures Daughter        Social History:   Social History     Socioeconomic History    Marital status:    Tobacco Use    Smoking status: Former     Current packs/day: 0.00     Types: Cigarettes     Quit date:      Years since quittin.9     Passive exposure: Never    Smokeless tobacco: Never   Vaping Use    Vaping status: Never Used    Passive vaping exposure: Yes   Substance and Sexual Activity    Alcohol use: Never    Drug use: Never    Sexual activity: Defer       Medications:     Current Outpatient Medications:     albuterol (ProAir RespiClick) 108 (90 Base) MCG/ACT inhaler, 1 puff Every 6 (Six) Hours As Needed., Disp: , Rfl:     Asmanex, 30 Metered Doses, 220 MCG/ACT  "inhaler, 1 puff As Needed., Disp: , Rfl:     cycloSPORINE (RESTASIS) 0.05 % ophthalmic emulsion, , Disp: , Rfl:     dexlansoprazole (DEXILANT) 60 MG capsule, Daily As Needed., Disp: , Rfl:     hydroCHLOROthiazide 12.5 MG tablet, Take 1 tablet by mouth Daily As Needed (elevated BP)., Disp: 30 tablet, Rfl: 1    ibuprofen (ADVIL,MOTRIN) 800 MG tablet, Take 1 tablet by mouth Every 6 (Six) Hours As Needed for Moderate Pain., Disp: , Rfl:     levothyroxine (SYNTHROID, LEVOTHROID) 75 MCG tablet, Take 88 mcg by mouth Daily., Disp: , Rfl:     montelukast (SINGULAIR) 10 MG tablet, Take 1 tablet by mouth Every Night., Disp: , Rfl:     multivitamin with minerals tablet tablet, Take 1 tablet by mouth Daily., Disp: , Rfl:     olmesartan (BENICAR) 20 MG tablet, Take 1 tablet by mouth Daily As Needed (qhs)., Disp: 90 tablet, Rfl: 3    Omega-3 Fatty Acids (fish oil) 1000 MG capsule capsule, Take  by mouth Daily With Breakfast., Disp: , Rfl:     terconazole (TERAZOL 3) 0.8 % vaginal cream, , Disp: , Rfl:     traMADol (ULTRAM) 50 MG tablet, Take 1 tablet by mouth Every 12 (Twelve) Hours As Needed for Moderate Pain., Disp: , Rfl:     bisoprolol (ZEBeta) 5 MG tablet, Take 0.5 tablets by mouth Daily. (Patient not taking: Reported on 9/16/2024), Disp: 30 tablet, Rfl: 6    Inclisiran Sodium 284 MG/1.5ML solution prefilled syringe, Inject 1.5 mL under the skin into the appropriate area as directed Every 3 (Three) Months. (Patient not taking: Reported on 9/16/2024), Disp: 1.5 mL, Rfl: 1    Allergies:   Allergies   Allergen Reactions    Atorvastatin Myalgia    Crestor [Rosuvastatin] Myalgia    Livalo [Pitavastatin] Myalgia    Simvastatin Myalgia    Latex Itching    Gentamicin Palpitations     Other reaction(s): VOMITING         STEADI Fall Risk Assessment was completed, and patient is at HIGH risk for falls. Assessment completed on:11/14/2024    Objective     Objective:    /80   Pulse 55   Ht 160 cm (62.99\")   Wt 78.7 kg (173 lb 8 " oz)   SpO2 97%   BMI 30.74 kg/m²   Body mass index is 30.74 kg/m².    Physical Exam  Vitals reviewed.   Constitutional:       Appearance: Normal appearance.   HENT:      Head: Normocephalic and atraumatic.      Mouth/Throat:      Mouth: Mucous membranes are moist.      Pharynx: Oropharynx is clear.   Pulmonary:      Effort: Pulmonary effort is normal. No respiratory distress.   Musculoskeletal:      Right lower leg: No edema.      Left lower leg: No edema.   Neurological:      Mental Status: She is alert.          Neurology Exam:    General apperance: NAD.     Mental status: Alert, awake and oriented to time place and person.    Fund of knowledge:  Normal.     Language and Speech: No aphasia or dysarthria.    Naming , Repetition and Comprehension:  Can name objects, repeat a sentence and follow commands. Speech is clear and fluent with good repetition, comprehension, and naming.    Cranial Nerves:   CN II: Visual fields are full. Intact. Pupils - PERRLA  CN III, IV and VI: Extraocular movements are intact. Normal saccades.   CN V: Facial sensation is intact.   CN VII: Muscles of facial expression reveal no asymmetry. Intact.   CN VIII: Hearing is intact.   CN IX and X: Palate elevates symmetrically. Intact  CN XI: Shoulder shrug is intact.   CN XII: Tongue is midline without evidence of atrophy or fasciculation.     Motor:  Right UE muscle strength 5/5. Normal tone.     Left UE muscle strength 5/5. Normal tone.      Right LE muscle strength 5/5. Normal tone.     Left LE muscle strength 5/5. Normal tone.      No resting tremor of BUE  No action tremor of BUE  No cogwheeling or rigidity    Sensory: Normal light touch sensation bilaterally.    DTRs: 2+ bilaterally in upper and lower extremities.    Coordination: Normal finger-to-nose    Gait: Normal. No assistive device, no tremor while walking    MMSE 29/30, recall 3/3      Results:   Imaging:   No Images in the past 120 days found..     Labs:   TSH           9/13/2024    15:20   TSH   TSH 0.698          Assessment / Plan      Assessment/Plan:   Diagnoses and all orders for this visit:    1. Memory loss (Primary)  -     Ambulatory Referral to Speech Therapy for Evaluation & Treatment    2. Tremor    3. Generalized weakness      Shelly Stevens returns to neurology clinic for continued evaluation of memory loss, tremors, and generalized weakness. Vitamin B12 652, folate was greater than 20, methylmalonic acid 162, TSH 0.698, free T4 was 1.49, acetylcholine receptor antibody panel was negative, MuSK antibody was negative, RPR was nonreactive.  MRI brain performed on 10/18/2024 showed no acute intracranial pathology.  Mild deep white matter disease consistent with small vessel ischemic changes. MRI brain was reviewed with collaborating neurologist, Dr. More, who felt that the white matter findings were not consistent with MS. Of note she was evaluated by Dr. Macedo in 2022 and it was felt at that time that her tremors were due to a functional movement disorder given that they were triggered by anxiety/stress and basal ganglia and mid brain were normal on MRI, no synkinesis or other eyelid myoclonus. Memory today is stable at MMSE 29/30, recall 3/3. She is willing to have cognitive therapy which I think will be beneficial for her cognition, given her current testing we did not start cognitive medication today, however will plan on further assessing at follow up appointment and pending result of MOCA at that time may consider initiation of cognitive enhancing medication.  Lab work reassuring against myasthenia gravis as cause of her weakness.  We discussed that we could further consider assessment with EMG to assess for any type of motor neuron disease, EMG was deferred at this time by patient.  We also discussed that reviewing her prior neurosurgery notes it was suspected that she had a component of neurogenic claudication which I feel could be contributing to back pain and  lower extremity weakness, I have advised that she get back in contact with neurosurgery as planned previously was to perform lumbar myelogram.  Otherwise we will plan to see Shelly back in clinic in 3 months or sooner for any questions or concerns.         Patient Education:       Reviewed medications, potential side effects and signs and symptoms to report. Discussed risk versus benefits of treatment plan with patient and/or family-including medications, labs and radiology that may be ordered. Addressed questions and concerns during visit. Patient and/or family verbalized understanding and agree with plan. Instructed to call the office with any questions and report to ER with any life-threatening symptoms.     Follow Up:   Return in about 3 months (around 2/14/2025).    I spent  52  minutes in the care of this patient. I personally spent 50 percent of this time counseling and discussing diagnostic testing and evaluation .       During this visit the following were done:  Labs Reviewed [x]    Labs Ordered []    Radiology Reports Reviewed [x]    Radiology Ordered []    PCP Records Reviewed []    Referring Provider Records Reviewed []    ER Records Reviewed []    Hospital Records Reviewed []    History Obtained From Family []    Radiology Images Reviewed [x]    Other Reviewed []    Records Requested []      TIARA Stanley  [facility]  [address] [address] [phone]

## 2024-11-15 ENCOUNTER — TELEPHONE (OUTPATIENT)
Dept: NEUROLOGY | Facility: CLINIC | Age: 74
End: 2024-11-15
Payer: MEDICARE

## 2024-11-15 NOTE — TELEPHONE ENCOUNTER
----- Message from Lenora Vargas sent at 11/15/2024  3:38 PM EST -----  Will you please let Shelly know that Dr. More reviewed her MRI brain as well and he does not feel the white matter changes are suggestive of MS.

## 2025-01-03 ENCOUNTER — OFFICE VISIT (OUTPATIENT)
Dept: CARDIOLOGY | Facility: CLINIC | Age: 75
End: 2025-01-03
Payer: MEDICARE

## 2025-01-03 VITALS
DIASTOLIC BLOOD PRESSURE: 82 MMHG | WEIGHT: 172 LBS | SYSTOLIC BLOOD PRESSURE: 128 MMHG | BODY MASS INDEX: 30.48 KG/M2 | HEIGHT: 63 IN | HEART RATE: 71 BPM | OXYGEN SATURATION: 95 %

## 2025-01-03 DIAGNOSIS — I47.19 ATRIAL TACHYCARDIA: Primary | ICD-10-CM

## 2025-01-03 PROCEDURE — 3079F DIAST BP 80-89 MM HG: CPT | Performed by: INTERNAL MEDICINE

## 2025-01-03 PROCEDURE — 99214 OFFICE O/P EST MOD 30 MIN: CPT | Performed by: INTERNAL MEDICINE

## 2025-01-03 PROCEDURE — 3074F SYST BP LT 130 MM HG: CPT | Performed by: INTERNAL MEDICINE

## 2025-01-03 RX ORDER — PILOCARPINE HYDROCHLORIDE 5 MG/1
TABLET, FILM COATED ORAL
COMMUNITY
Start: 2024-12-16

## 2025-01-03 RX ORDER — CLOTRIMAZOLE 10 MG/1
LOZENGE ORAL AS NEEDED
COMMUNITY
Start: 2024-12-18

## 2025-01-03 NOTE — PROGRESS NOTES
Cardiac Electrophysiology Outpatient Follow Up Note            [facility] at [facility]    Follow Up Office Visit      Shelly Stevens  [id number]  01/03/2025  [unfilled]  [unfilled]    Primary Care Physician: Sriram Morris APRN    Referred By: No ref. provider found    Subjective     Chief Complaint:   Diagnoses and all orders for this visit:    1. Atrial tachycardia (Primary)      Chief Complaint   Patient presents with    Atrial tachycardia       History of Present Illness:   Shelly Stevens is a 74 y.o. female who presents to my electrophysiology clinic for follow up of above.      Past Medical History:   Past Medical History:   Diagnosis Date    Anemia     Anxiety     Asthma     Back problem     Bronchitis     Claustrophobia     COVID-19 07/2021    Depression     Headache     HL (hearing loss)     Mononucleosis 05/29/2023    Pre-diabetes     Thyroid disease     Tremors of nervous system        Past Surgical History:   Past Surgical History:   Procedure Laterality Date    CARDIOVASCULAR STRESS TEST  06/24/2022    4 Min. 7.00 METS. 91% THR. 215/74. EF 56%. Breast attenuation    CARDIOVASCULAR STRESS TEST  05/10/2024    Lexiscan- EF > 70%. Negative    CONVERTED (HISTORICAL) HOLTER  02/23/2021    -11 Days. Avg 64. . 162 SVT    CONVERTED (HISTORICAL) HOLTER  06/10/2024    < 10 Days. Avg 67. . 143 SVT    CONVERTED (HISTORICAL) HOLTER  08/09/2024    7 Days. AVG 67. . 65 SVT    ECHO - CONVERTED  06/15/2022    EF 65%. Trace-Mild MR & AI. RVSP- 27 mmHg    ECHO - CONVERTED  05/10/2024    EF 70%. Mild MR & AI. RVSP- 31 mmHg    GALLBLADDER SURGERY      HYSTERECTOMY      OTHER SURGICAL HISTORY  11/30/2023    Pulse O2- Borderline    SPHENOIDECTOMY      TONSILLECTOMY         Family History:   Family History   Problem Relation Age of Onset    COPD Mother     Asthma Mother     Arthritis Mother     Hyperlipidemia Mother     Heart attack  Maternal Grandfather     No Known Problems Son     No Known Problems Son     Seizures Daughter        Social History:   Social History     Socioeconomic History    Marital status:    Tobacco Use    Smoking status: Former     Current packs/day: 0.00     Types: Cigarettes     Quit date: 1970     Years since quittin.0     Passive exposure: Never    Smokeless tobacco: Never   Vaping Use    Vaping status: Never Used    Passive vaping exposure: Yes   Substance and Sexual Activity    Alcohol use: Never    Drug use: Never    Sexual activity: Defer       Medications:     Current Outpatient Medications:     albuterol (ProAir RespiClick) 108 (90 Base) MCG/ACT inhaler, 1 puff Every 6 (Six) Hours As Needed., Disp: , Rfl:     Asmanex, 30 Metered Doses, 220 MCG/ACT inhaler, 1 puff As Needed., Disp: , Rfl:     clotrimazole (MYCELEX) 10 MG tammy, As Needed., Disp: , Rfl:     cycloSPORINE (RESTASIS) 0.05 % ophthalmic emulsion, , Disp: , Rfl:     dexlansoprazole (DEXILANT) 60 MG capsule, Daily As Needed., Disp: , Rfl:     ibuprofen (ADVIL,MOTRIN) 800 MG tablet, Take 1 tablet by mouth As Needed for Moderate Pain., Disp: , Rfl:     levothyroxine (SYNTHROID, LEVOTHROID) 75 MCG tablet, Take 88 mcg by mouth Daily., Disp: , Rfl:     montelukast (SINGULAIR) 10 MG tablet, Take 1 tablet by mouth Every Night., Disp: , Rfl:     multivitamin with minerals tablet tablet, Take 1 tablet by mouth Daily., Disp: , Rfl:     Omega-3 Fatty Acids (fish oil) 1000 MG capsule capsule, Take  by mouth Daily With Breakfast., Disp: , Rfl:     bisoprolol (ZEBeta) 5 MG tablet, Take 0.5 tablets by mouth Daily. (Patient not taking: Reported on 1/3/2025), Disp: 30 tablet, Rfl: 6    hydroCHLOROthiazide 12.5 MG tablet, Take 1 tablet by mouth Daily As Needed (elevated BP). (Patient not taking: Reported on 1/3/2025), Disp: 30 tablet, Rfl: 1    Inclisiran Sodium 284 MG/1.5ML solution prefilled syringe, Inject 1.5 mL under the skin into the appropriate area as  "directed Every 3 (Three) Months. (Patient not taking: Reported on 1/3/2025), Disp: 1.5 mL, Rfl: 1    olmesartan (BENICAR) 20 MG tablet, Take 1 tablet by mouth Daily As Needed (qhs). (Patient not taking: Reported on 1/3/2025), Disp: 90 tablet, Rfl: 3    pilocarpine (SALAGEN) 5 MG tablet, Has not started taking yet (Patient not taking: Reported on 1/3/2025), Disp: , Rfl:     terconazole (TERAZOL 3) 0.8 % vaginal cream, , Disp: , Rfl:     traMADol (ULTRAM) 50 MG tablet, Take 1 tablet by mouth Every 12 (Twelve) Hours As Needed for Moderate Pain. (Patient not taking: Reported on 1/3/2025), Disp: , Rfl:     Allergies:   Allergies   Allergen Reactions    Atorvastatin Myalgia    Crestor [Rosuvastatin] Myalgia    Livalo [Pitavastatin] Myalgia    Simvastatin Myalgia    Latex Itching    Gentamicin Palpitations     Other reaction(s): VOMITING       Objective   Vital Signs:   Vitals:    01/03/25 1347   BP: 128/82   BP Location: Left arm   Patient Position: Sitting   Pulse: 71   SpO2: 95%   Weight: 78 kg (172 lb)   Height: 160 cm (63\")       PHYSICAL EXAM  General appearance: Awake, alert, cooperative  Head: Normocephalic, without obvious abnormality, atraumatic  Eyes: Conjunctivae/corneas clear, EOMs intact  Neck: no adenopathy, no carotid bruit, no JVD, and thyroid: not enlarged  Lungs: clear to auscultation bilaterally and no rhonchi or crackles\", ' symmetric  Heart: regular rate and rhythm, S1, S2 normal, no murmur, click, rub or gallop  Abdomen: Soft, non-tender, bowel sounds normal,  no organomegaly  Extremities: extremities normal, atraumatic, no cyanosis or edema  Skin: Skin color, turgor normal, no rashes or lesions  Neurologic: Grossly normal     No results found for: \"GLUCOSE\", \"CALCIUM\", \"NA\", \"K\", \"CO2\", \"CL\", \"BUN\", \"CREATININE\", \"EGFRIFAFRI\", \"EGFRIFNONA\", \"BCR\", \"ANIONGAP\"  No results found for: \"WBC\", \"HGB\", \"HCT\", \"MCV\", \"PLT\"  No results found for: \"INR\", \"PROTIME\"  Lab Results   Component Value Date    TSH " 0.698 09/13/2024       Cardiac Testing:     I personally viewed and interpreted the patient's EKG/Telemetry/lab data    Procedures    Tobacco Cessation: N/A  Obstructive Sleep Apnea Screening: Completed    Advance Care Planning   ACP discussion was declined by the patient. Patient does not have an advance directive, declines further assistance.       Assessment & Plan    Diagnoses and all orders for this visit:    1. Atrial tachycardia (Primary)         Diagnosis Plan   1. Atrial tachycardia  Totally asymptomatic.    She was referred because there was a note from the [facility] on September 4 that indicated that somebody saw an EKG there that demonstrated atrial fibrillation.  Now despite several months of efforts we have not been able to produce this EKG.  She had denied any symptoms at the time.  She was not told that she had A-fib by anybody [facility].  Really we have no actual evidence whatsoever that she has any history of atrial fibrillation.    She will try with her pulmonologist who referred her to the [facility] and she will also contact the [facility] directly.    She will follow-up with Mary Winn her nurse practitioner here in [place] for blood pressure and hypertension management.    If 1 day any record surfaces that indicates that she has atrial fibrillation she should be anticoagulated.  In absence of this there is nothing to do from an EP perspective.    I do not need to see her back again.    Should A-fib be revealed by any records at the outside institution Mary can can institute anticoagulation.  This is not an EP issue.        Body mass index is 30.47 kg/m².    I spent 38 minutes in consultation with this patient which included more than 65% of this time in direct face-to-face counseling, physical examination and discussion of my assessment and findings and this shared decision making with the patient.  The remainder of the time not spent face-to-face was  performing one, some or all of the following actions: preparing to see the patient (e.g. reviewing tests, prior clinicians' notes, etc), ordering medications, tests or procedures, coordination of care, discussion of the plan with other healthcare providers, documenting clinical information in epic as well as independently interpreting results and communication of these results to the patient family and/or caregiver(s).  Please note that this explicitly excludes time spent on other separate billable services such as performing procedures or test interpretation, when applicable.      Follow Up:       Thank you for allowing me to participate in the care of your patient. Please to not hesitate to contact me with additional questions or concerns.      Mahin Villalobos DO, FACC, RS  Cardiac Electrophysiologist  [facility] / [facility]

## 2025-01-10 ENCOUNTER — TELEPHONE (OUTPATIENT)
Dept: NEUROLOGY | Facility: CLINIC | Age: 75
End: 2025-01-10
Payer: MEDICARE

## 2025-01-10 NOTE — TELEPHONE ENCOUNTER
Patient called letting us know that she is not going to be able to do cognitive therapy every week due to transportation issues.  I explained that we understand but to try and go as much as she can even if its not every week.  She also wanted to know if she needed to keep her follow up appt with Lenora-after speaking with Lenora I explained that she would advise keeping her appt as she had several issues she was concerned with (memory, generalized weakness) and she would be able to be reassessed at that time.  She was in good understanding.

## 2025-02-03 ENCOUNTER — TELEPHONE (OUTPATIENT)
Dept: CARDIOLOGY | Facility: CLINIC | Age: 75
End: 2025-02-03

## 2025-02-03 NOTE — TELEPHONE ENCOUNTER
Caller: Shelly Stevens    Relationship: Self    Best call back number: [phone]    What is the best time to reach you: AFTERNOONS    Who are you requesting to speak with (clinical staff, provider,  specific staff member): ANYONE    What was the call regarding: PATIENT CALLING TO INFORM THAT SHE DROPPED OFF CARDIO PULMONARY EXERCISE TEST RESULTS FROM  AT  OFFICE. WOULD LIKE FOR  TO TAKE A LOOK AT THOSE RECORDS.

## 2025-03-14 ENCOUNTER — OFFICE VISIT (OUTPATIENT)
Dept: ENDOCRINOLOGY | Facility: CLINIC | Age: 75
End: 2025-03-14
Payer: MEDICARE

## 2025-03-14 VITALS
HEIGHT: 63 IN | WEIGHT: 176 LBS | HEART RATE: 64 BPM | SYSTOLIC BLOOD PRESSURE: 132 MMHG | DIASTOLIC BLOOD PRESSURE: 70 MMHG | BODY MASS INDEX: 31.18 KG/M2

## 2025-03-14 DIAGNOSIS — E03.9 HYPOTHYROIDISM, UNSPECIFIED TYPE: Primary | ICD-10-CM

## 2025-03-14 DIAGNOSIS — E04.2 MULTIPLE THYROID NODULES: ICD-10-CM

## 2025-03-14 PROCEDURE — 3075F SYST BP GE 130 - 139MM HG: CPT | Performed by: INTERNAL MEDICINE

## 2025-03-14 PROCEDURE — 99204 OFFICE O/P NEW MOD 45 MIN: CPT | Performed by: INTERNAL MEDICINE

## 2025-03-14 PROCEDURE — 3078F DIAST BP <80 MM HG: CPT | Performed by: INTERNAL MEDICINE

## 2025-03-14 NOTE — PROGRESS NOTES
Chief Complaint   Patient presents with    Thyroid Problem        New patient who is being seen in consultation regarding thyroid problem at the request of Nohelia Curiel APRN HPI   Shelly Stevens is a 74 y.o. female who presents for evaluation of thyroid problem.    Patient reports initial diagnosis in the 1990s. She believes she was initially started on thyroid hormone in an attempt to shrink thyroid nodules.  She is unsure if she was hypothyroid at that time.  She reports that nodules have been monitored intermittently the ultrasound over the years, last approximately 1 year ago with Dr. Denton in [place].  She reports that he said nodules have been the same size or may be slightly smaller.  She reports he indicated she did not need repeat imaging.  She has never required biopsy.     Patient is currently taking levothyroxine 88 mcg daily.  She reports that she generally takes brand-name Synthroid but most recent refill was sent with generic.  She reports that her PCP sent her for a second opinion as she had concerns for ongoing symptoms.  She notes that she has felt generally badly since having COVID in 2021.  Patient reports dry mouth, intermittent sensation of swelling, intermittent difficulty swallowing, hair loss, fatigue, itching.  She also reports that she developed hypertension when her levels were too low but this resolved after dose increase from 75 to 88 mcg.  She does report some intermittent elevated blood pressure readings at home recently.      Past Medical History:   Diagnosis Date    Anemia     Anxiety     Asthma     Back problem     Bronchitis     Claustrophobia     COVID-19 07/2021    Depression     Headache     HL (hearing loss)     Mononucleosis 05/29/2023    Pre-diabetes     Thyroid disease     Tremors of nervous system      Past Surgical History:   Procedure Laterality Date    CARDIOVASCULAR STRESS TEST  06/24/2022    4 Min. 7.00 METS. 91% THR. 215/74. EF 56%. Breast attenuation     CARDIOVASCULAR STRESS TEST  05/10/2024    Lexiscan- EF > 70%. Negative    CONVERTED (HISTORICAL) HOLTER  2021    -11 Days. Avg 64. . 162 SVT    CONVERTED (HISTORICAL) HOLTER  06/10/2024    < 10 Days. Avg 67. . 143 SVT    CONVERTED (HISTORICAL) HOLTER  2024    7 Days. AVG 67. . 65 SVT    ECHO - CONVERTED  06/15/2022    EF 65%. Trace-Mild MR & AI. RVSP- 27 mmHg    ECHO - CONVERTED  05/10/2024    EF 70%. Mild MR & AI. RVSP- 31 mmHg    GALLBLADDER SURGERY      HYSTERECTOMY      OTHER SURGICAL HISTORY  2023    Pulse O2- Borderline    SPHENOIDECTOMY      TONSILLECTOMY        Family History   Problem Relation Age of Onset    COPD Mother     Asthma Mother     Arthritis Mother     Hyperlipidemia Mother     Heart attack Maternal Grandfather     No Known Problems Son     No Known Problems Son     Seizures Daughter       Social History     Socioeconomic History    Marital status:    Tobacco Use    Smoking status: Former     Current packs/day: 0.00     Types: Cigarettes     Quit date:      Years since quittin.2     Passive exposure: Never    Smokeless tobacco: Never   Vaping Use    Vaping status: Never Used    Passive vaping exposure: Yes   Substance and Sexual Activity    Alcohol use: Never    Drug use: Never    Sexual activity: Defer      Allergies   Allergen Reactions    Atorvastatin Myalgia    Crestor [Rosuvastatin] Myalgia    Livalo [Pitavastatin] Myalgia    Simvastatin Myalgia    Latex Itching    Gentamicin Palpitations     Other reaction(s): VOMITING      Current Outpatient Medications on File Prior to Visit   Medication Sig Dispense Refill    albuterol (ProAir RespiClick) 108 (90 Base) MCG/ACT inhaler 1 puff Every 6 (Six) Hours As Needed.      Asmanex, 30 Metered Doses, 220 MCG/ACT inhaler 1 puff As Needed.      clotrimazole (MYCELEX) 10 MG tammy As Needed.      cycloSPORINE (RESTASIS) 0.05 % ophthalmic emulsion       dexlansoprazole (DEXILANT) 60 MG  capsule Daily As Needed.      hydroCHLOROthiazide 12.5 MG tablet Take 1 tablet by mouth Daily As Needed (elevated BP). 30 tablet 1    levothyroxine (SYNTHROID, LEVOTHROID) 75 MCG tablet Take 88 mcg by mouth Daily.      montelukast (SINGULAIR) 10 MG tablet Take 1 tablet by mouth Every Night.      multivitamin with minerals tablet tablet Take 1 tablet by mouth Daily.      olmesartan (BENICAR) 20 MG tablet Take 1 tablet by mouth Daily As Needed (qhs). 90 tablet 3    Omega-3 Fatty Acids (fish oil) 1000 MG capsule capsule Take  by mouth Daily With Breakfast.      terconazole (TERAZOL 3) 0.8 % vaginal cream       traMADol (ULTRAM) 50 MG tablet Take 1 tablet by mouth Every 12 (Twelve) Hours As Needed for Moderate Pain.      [DISCONTINUED] bisoprolol (ZEBeta) 5 MG tablet Take 0.5 tablets by mouth Daily. (Patient not taking: Reported on 3/14/2025) 30 tablet 6    [DISCONTINUED] ibuprofen (ADVIL,MOTRIN) 800 MG tablet Take 1 tablet by mouth As Needed for Moderate Pain. (Patient not taking: Reported on 3/14/2025)      [DISCONTINUED] Inclisiran Sodium 284 MG/1.5ML solution prefilled syringe Inject 1.5 mL under the skin into the appropriate area as directed Every 3 (Three) Months. (Patient not taking: Reported on 3/14/2025) 1.5 mL 1    [DISCONTINUED] pilocarpine (SALAGEN) 5 MG tablet Has not started taking yet (Patient not taking: Reported on 3/14/2025)       No current facility-administered medications on file prior to visit.        Review of Systems   Constitutional:  Positive for activity change, chills, fatigue and unexpected weight gain.   HENT:  Positive for dental problem, ear pain, facial swelling, postnasal drip, sore throat, tinnitus and trouble swallowing (intermittent).    Eyes:  Positive for redness and itching.   Respiratory:  Positive for cough, chest tightness, shortness of breath and wheezing.    Cardiovascular:  Positive for palpitations.   Gastrointestinal:  Positive for abdominal distention and GERD.  "  Endocrine: Positive for cold intolerance, polydipsia and polyuria.   Genitourinary:  Positive for urgency and urinary incontinence.   Musculoskeletal:  Positive for arthralgias, back pain, gait problem and neck stiffness.   Allergic/Immunologic: Positive for environmental allergies.   Neurological:  Positive for tremors, weakness, light-headedness and headache.   Psychiatric/Behavioral:  Positive for agitation and sleep disturbance. The patient is nervous/anxious.    See HPI    Vitals:    03/14/25 1415   BP: 132/70   Pulse: 64   Weight: 79.8 kg (176 lb)   Height: 160 cm (62.99\")   Body mass index is 31.18 kg/m².     Physical Exam  Vitals reviewed.   Neck:      Thyroid: No thyromegaly or thyroid tenderness.   Cardiovascular:      Rate and Rhythm: Normal rate and regular rhythm.   Pulmonary:      Effort: Pulmonary effort is normal.      Breath sounds: Normal breath sounds.   Lymphadenopathy:      Cervical: No cervical adenopathy.   Neurological:      Mental Status: She is alert.   Psychiatric:         Mood and Affect: Mood and affect normal.        Labs/Imaging   Latest Reference Range & Units 09/13/24 15:20   TSH Baseline 0.270 - 4.200 uIU/mL 0.698   Free T4 0.92 - 1.68 ng/dL 1.49     Labs dated 12/4/2024  TSH 1.37    Labs dated 2/26/2025  TSH 1.04    Assessment and Plan    Diagnoses and all orders for this visit:    1. Hypothyroidism, unspecified type (Primary)  Patient is currently taking levothyroxine 88 mcg daily  Most recent TSH was 1.04, this is stable in comparison to available prior testing.  Discussed with patient that in the setting of normal thyroid function testing I cannot attribute her ongoing symptoms to thyroid hormone dysregulation.  I would recommend evaluation for other potential causes with PCP.  Patient inquired about monitoring of other thyroid hormone markers.  We discussed in certain scenarios it can be beneficial to measure free T3 or free T4.  However, generally, TSH is adequate for " routine monitoring of hypothyroidism.  Patient inquired about alternative preparations of thyroid hormone.  She does report a history of anxiety, palpitations, tachycardia.  In this setting, I would recommend continuation of therapy with T4.  Patient will continue current medical therapy.    2. Multiple thyroid nodules  Per patient report.  There is no distinctly palpable nodule on exam.  Patient reports she has never required FNA.  Patient reports that nodules have been monitored intermittently via ultrasound for more than 20 years.  Most recently by Dr. Denton in [place], last approximately 1 year ago.  She reports at time of last ultrasound he mention she did not need further imaging in the future.  I will request imaging for review.    Patient reports that she would prefer to complete routine follow-up with her PCP.  Discussed that I would be happy to see her back anytime in the future should new concerns arise.       Return if symptoms worsen or fail to improve. The patient was instructed to contact the clinic with any interval questions or concerns.    Electronically signed by: Hyun Simon MD     Dictated Utilizing Dragon Dictation

## 2025-03-17 ENCOUNTER — TELEPHONE (OUTPATIENT)
Dept: ENDOCRINOLOGY | Facility: CLINIC | Age: 75
End: 2025-03-17
Payer: MEDICARE

## 2025-03-17 NOTE — TELEPHONE ENCOUNTER
----- Message from Hyun Simon sent at 3/14/2025  3:12 PM EDT -----  Please contact office of Dr. Denton in [place] and request results of prior thyroid ultrasounds.

## 2025-05-07 ENCOUNTER — LAB (OUTPATIENT)
Dept: LAB | Facility: HOSPITAL | Age: 75
End: 2025-05-07
Payer: MEDICARE

## 2025-05-07 DIAGNOSIS — E78.00 HYPERCHOLESTEROLEMIA: ICD-10-CM

## 2025-05-07 DIAGNOSIS — I47.19 ATRIAL TACHYCARDIA: ICD-10-CM

## 2025-05-07 DIAGNOSIS — R06.02 SOB (SHORTNESS OF BREATH): ICD-10-CM

## 2025-05-07 DIAGNOSIS — R73.9 HYPERGLYCEMIA: ICD-10-CM

## 2025-05-07 DIAGNOSIS — E55.9 VITAMIN D DEFICIENCY: ICD-10-CM

## 2025-05-07 DIAGNOSIS — I10 PRIMARY HYPERTENSION: ICD-10-CM

## 2025-05-07 DIAGNOSIS — I47.10 PAROXYSMAL SVT (SUPRAVENTRICULAR TACHYCARDIA): ICD-10-CM

## 2025-05-07 LAB
ALBUMIN SERPL-MCNC: 4.2 G/DL (ref 3.5–5.2)
ALBUMIN/GLOB SERPL: 1.4 G/DL
ALP SERPL-CCNC: 68 U/L (ref 39–117)
ALT SERPL W P-5'-P-CCNC: 36 U/L (ref 1–33)
ANION GAP SERPL CALCULATED.3IONS-SCNC: 9.5 MMOL/L (ref 5–15)
AST SERPL-CCNC: 29 U/L (ref 1–32)
BILIRUB SERPL-MCNC: 0.6 MG/DL (ref 0–1.2)
BUN SERPL-MCNC: 11 MG/DL (ref 8–23)
BUN/CREAT SERPL: 13.8 (ref 7–25)
CALCIUM SPEC-SCNC: 8.6 MG/DL (ref 8.6–10.5)
CHLORIDE SERPL-SCNC: 105 MMOL/L (ref 98–107)
CHOLEST SERPL-MCNC: 169 MG/DL (ref 0–200)
CO2 SERPL-SCNC: 28.5 MMOL/L (ref 22–29)
CREAT SERPL-MCNC: 0.8 MG/DL (ref 0.57–1)
DEPRECATED RDW RBC AUTO: 45.1 FL (ref 37–54)
EGFRCR SERPLBLD CKD-EPI 2021: 77.9 ML/MIN/1.73
ERYTHROCYTE [DISTWIDTH] IN BLOOD BY AUTOMATED COUNT: 13.1 % (ref 12.3–15.4)
GLOBULIN UR ELPH-MCNC: 3 GM/DL
GLUCOSE SERPL-MCNC: 105 MG/DL (ref 65–99)
HBA1C MFR BLD: 5.8 % (ref 4.8–5.6)
HCT VFR BLD AUTO: 41.7 % (ref 34–46.6)
HDLC SERPL-MCNC: 45 MG/DL (ref 40–60)
HGB BLD-MCNC: 13.5 G/DL (ref 12–15.9)
LDLC SERPL CALC-MCNC: 106 MG/DL (ref 0–100)
LDLC/HDLC SERPL: 2.33 {RATIO}
MAGNESIUM SERPL-MCNC: 2.2 MG/DL (ref 1.6–2.4)
MCH RBC QN AUTO: 30.3 PG (ref 26.6–33)
MCHC RBC AUTO-ENTMCNC: 32.4 G/DL (ref 31.5–35.7)
MCV RBC AUTO: 93.5 FL (ref 79–97)
PLATELET # BLD AUTO: 236 10*3/MM3 (ref 140–450)
PMV BLD AUTO: 9.8 FL (ref 6–12)
POTASSIUM SERPL-SCNC: 4 MMOL/L (ref 3.5–5.2)
PROT SERPL-MCNC: 7.2 G/DL (ref 6–8.5)
RBC # BLD AUTO: 4.46 10*6/MM3 (ref 3.77–5.28)
SODIUM SERPL-SCNC: 143 MMOL/L (ref 136–145)
TRIGL SERPL-MCNC: 96 MG/DL (ref 0–150)
TSH SERPL DL<=0.05 MIU/L-ACNC: 1.72 UIU/ML (ref 0.27–4.2)
VLDLC SERPL-MCNC: 18 MG/DL (ref 5–40)
WBC NRBC COR # BLD AUTO: 5.47 10*3/MM3 (ref 3.4–10.8)

## 2025-05-07 PROCEDURE — 85027 COMPLETE CBC AUTOMATED: CPT

## 2025-05-07 PROCEDURE — 36415 COLL VENOUS BLD VENIPUNCTURE: CPT

## 2025-05-07 PROCEDURE — 82652 VIT D 1 25-DIHYDROXY: CPT

## 2025-05-07 PROCEDURE — 80053 COMPREHEN METABOLIC PANEL: CPT

## 2025-05-07 PROCEDURE — 84436 ASSAY OF TOTAL THYROXINE: CPT

## 2025-05-07 PROCEDURE — 83036 HEMOGLOBIN GLYCOSYLATED A1C: CPT

## 2025-05-07 PROCEDURE — 84443 ASSAY THYROID STIM HORMONE: CPT

## 2025-05-07 PROCEDURE — 83735 ASSAY OF MAGNESIUM: CPT

## 2025-05-07 PROCEDURE — 80061 LIPID PANEL: CPT

## 2025-05-08 LAB — T4 SERPL-MCNC: 7.39 MCG/DL (ref 4.5–11.7)

## 2025-05-10 LAB — 1,25(OH)2D SERPL-MCNC: 26.2 PG/ML (ref 24.8–81.5)

## 2025-05-13 ENCOUNTER — OFFICE VISIT (OUTPATIENT)
Dept: NEUROLOGY | Facility: CLINIC | Age: 75
End: 2025-05-13
Payer: MEDICARE

## 2025-05-13 VITALS
RESPIRATION RATE: 18 BRPM | SYSTOLIC BLOOD PRESSURE: 118 MMHG | BODY MASS INDEX: 30.12 KG/M2 | WEIGHT: 170 LBS | HEART RATE: 67 BPM | DIASTOLIC BLOOD PRESSURE: 76 MMHG | HEIGHT: 63 IN | OXYGEN SATURATION: 97 %

## 2025-05-13 DIAGNOSIS — Z87.39 PERSONAL HISTORY OF FIBROMYALGIA: ICD-10-CM

## 2025-05-13 DIAGNOSIS — R41.3 MEMORY LOSS: Primary | ICD-10-CM

## 2025-05-13 NOTE — PROGRESS NOTES
"   Neuro Office Visit      Encounter Date: 2025   Patient Name: Shelly Stevens  : 1950   MRN: [id number]   PCP:  Sriram Morris APRN     Chief Complaint:    Chief Complaint   Patient presents with    Memory Loss       History of Present Illness: Shelly Stevens is a 74 y.o. female who is here today in Neurology for  memory loss    2024:  PMH of spinal stenosis of lumbar region with neurogenic claudication L3-4, history of vasovagal syncope, class I obesity, fibromyalgia, hyperglycemia, metabolic syndrome, chronic fatigue, hypothyroidism, hypercholesterolemia, hypertension, chronic bilateral low back pain with bilateral sciatica, atrial tachycardia      Shelly Stevens is a 74 y.o. female who comes to clinic today for evaluation of memory loss . She first noted memory/cognitive changes when she had difficulty operating the [employer] in her own vehicle. She will sometimes leave items on the stove, but has now been leaving a light on over the stove so that she remembers to turn it off. She feels that memory changes are very mild overall. She feels that sometimes her mouth will \"jerk\" when she is on the phone. She has noted symptoms since at least 2 years marked initially by forgetfulness . This has gradually worsened  over time. Additional symptoms have included impairments in short term memory . There have been associated  symptoms of anxiety. She denies  impairments in ADL's. She manages her medications  and finances. She continues to drive.  She is currently residing at home.     Family History: No neurologic conditions in the family   Personal Neurological History: Previously saw Dr. Macedo in  and was diagnosed with functional movement disorder  Education & Work History:College education   Psychological History: Anxiety  Sleep Habits & History: Poor sleep  Chronic Pain: Fibromyalgia    She reports that she feels that weakness makes it difficult to walk at " times, no assistive device, she has difficulty swallowing at times - sees Dr. Sanchez - feels that food goes down before she is ready at times. She takes Dexilant to aid with acid reflux. She has history of blepharospasm. She reports that sometimes she has difficulty opening her eyes at times. She will sometimes accidentally have mouth open. She has trouble walking at times and will sometimes loose her balance. Gait unsteady at times, rare falls.     She notes a sensation of her stomach shaking - resolves when she pushes on the back of her neck, has found that when she is nervous she will shake, she also states that she can be easily frustrated/irritable at times.    Of note she was evaluated by Dr. Macedo in 2022 and it was felt at that time that her tremors were due to a functional movement disorder given that they were triggered by anxiety/stress and basal ganglia and mid brain were normal on MRI, no synkinesis or other eyelid myoclonus.     She also reports that she previously had COVID-19 and feels that many of her symptoms developed after COVID.       Follow up visit 11/14/2024:  Shelly Stevens returns to neurology clinic for continued evaluation of memory loss, tremors, and generalized weakness. Vitamin B12 652, folate was greater than 20, methylmalonic acid 162, TSH 0.698, free T4 was 1.49, acetylcholine receptor antibody panel was negative, MuSK antibody was negative, RPR was nonreactive.  MRI brain performed on 10/18/2024 showed no acute intracranial pathology.  Mild deep white matter disease consistent with small vessel ischemic changes.    She notes fluctuating weakness, when she closes her eyes it is hard to open them again, Will sometimes jerk in the bed at night and feels like her hands will over react at times, she reports that at times that she has difficulty swallowing and will sometimes have food go down before she is ready for it to.     She feels like memory has improved some, she frequently  "writes things down, she does find that she will sometimes do \"silly things\" such as putting items that don't belong in the refrigerator. She notes that her sleep is poor due to back pain. She has found that she doesn't enjoy reading like she previously did because she has to keep re-reading the same sentence. She finds that word retrieval is more difficult, she notes that this started after COVID. Increased difficulty with filling out bills. She has seen improvement over time but it has been slow.     ATN Profile - per report a normal beta-amyloid 42/40 ratio and normal concentrations of [id number] and NfL were observed at this time, these results are not consistent with the presence of Alzheimer's related pathology.   A - Beta-amyloid 42/40 ratio was 0.120  Beta-amyloid 42 was 19.39  Beta-amyloid 40 was 162.19  T - p-tau 181 was 0.68  N - NfL, Plasma was 2.75    Neurofilament light chain normal at 2.72  Uric Acid normal at 6.1    Reports ongoing back and hip pain - she did previously see Dr. Man - she has progressive symptoms of low back pain with bilateral sciatica and probable neurogenic claudication.  Dr. Man had recommended a lumbar myelogram.  She did not end up getting this procedure done.  She notes chronic low back pain, she is willing to consider following back up with neurosurgery.      Current visit 5/13/2025:  Shelly returns for routine follow up. She reports that she continues to note mild memory changes such as leaving the bread out after feeding the birds or leaving the icecream out. Will do something and then forget what the was doing. Since COVID she used to love to read and feels like she has to keep reading the same sentence over again, doesn't enjoy reading like she used to.  She has to concentrate more to read and to pay the bills. No issues with ADL's. No issues with driving or with medication management. Since COVID she has not been able to multitask like she wants to. She does feel that " since noting the memory change it has improved mildly. She would prefer not to add medication at this time.    She reports generalized pain in back/hips. She reports that she was diagnosed with fibromyalgia previously and would like to establish with rheumatology. She doesn't sleep well, has to frequently wake up at night for her hip pain (prefers tylenol over tramadol). She has tried cymbalta before which caused side effects. She also has concern regarding Lyrica due to potential side effects.    She did not follow back up with neurosurgery, Dr. Man, for her low back. She reports that she fell 1 year ago and that her back pain has worsened since that time. Does not want to see neurosurgery at this time.    Subjective      Review of Systems   HENT:  Positive for trouble swallowing.    Eyes: Negative.    Respiratory: Negative.     Musculoskeletal:  Positive for arthralgias and neck pain.   Skin: Negative.    Allergic/Immunologic:        Latex allergy   Neurological:  Positive for tremors and weakness.        Memory loss   Psychiatric/Behavioral:  Positive for agitation and sleep disturbance. The patient is nervous/anxious.           Past Medical History:   Past Medical History:   Diagnosis Date    Anemia     Anxiety     Asthma     Back problem     Bronchitis     Claustrophobia     COVID-19 07/2021    Depression     Headache     HL (hearing loss)     Mononucleosis 05/29/2023    Pre-diabetes     Thyroid disease     Tremors of nervous system        Past Surgical History:   Past Surgical History:   Procedure Laterality Date    CARDIOVASCULAR STRESS TEST  06/24/2022    4 Min. 7.00 METS. 91% THR. 215/74. EF 56%. Breast attenuation    CARDIOVASCULAR STRESS TEST  05/10/2024    Lexiscan- EF > 70%. Negative    CONVERTED (HISTORICAL) HOLTER  02/23/2021    -11 Days. Avg 64. . 162 SVT    CONVERTED (HISTORICAL) HOLTER  06/10/2024    < 10 Days. Avg 67. . 143 SVT    CONVERTED (HISTORICAL) HOLTER  08/09/2024     7 Days. AVG 67. . 65 SVT    ECHO - CONVERTED  06/15/2022    EF 65%. Trace-Mild MR & AI. RVSP- 27 mmHg    ECHO - CONVERTED  05/10/2024    EF 70%. Mild MR & AI. RVSP- 31 mmHg    GALLBLADDER SURGERY      HYSTERECTOMY      OTHER SURGICAL HISTORY  2023    Pulse O2- Borderline    SPHENOIDECTOMY      TONSILLECTOMY         Family History:   Family History   Problem Relation Age of Onset    COPD Mother     Asthma Mother     Arthritis Mother     Hyperlipidemia Mother     Heart attack Maternal Grandfather     No Known Problems Son     No Known Problems Son     Seizures Daughter        Social History:   Social History     Socioeconomic History    Marital status:    Tobacco Use    Smoking status: Former     Current packs/day: 0.00     Types: Cigarettes     Quit date:      Years since quittin.4     Passive exposure: Never    Smokeless tobacco: Never   Vaping Use    Vaping status: Never Used    Passive vaping exposure: Yes   Substance and Sexual Activity    Alcohol use: Never    Drug use: Never    Sexual activity: Defer       Medications:     Current Outpatient Medications:     albuterol (ProAir RespiClick) 108 (90 Base) MCG/ACT inhaler, 1 puff Every 6 (Six) Hours As Needed., Disp: , Rfl:     Asmanex, 30 Metered Doses, 220 MCG/ACT inhaler, 1 puff As Needed., Disp: , Rfl:     clotrimazole (MYCELEX) 10 MG tammy, As Needed., Disp: , Rfl:     cycloSPORINE (RESTASIS) 0.05 % ophthalmic emulsion, , Disp: , Rfl:     dexlansoprazole (DEXILANT) 60 MG capsule, Daily As Needed., Disp: , Rfl:     levothyroxine (SYNTHROID, LEVOTHROID) 75 MCG tablet, Take 88 mcg by mouth Daily., Disp: , Rfl:     montelukast (SINGULAIR) 10 MG tablet, Take 1 tablet by mouth Every Night., Disp: , Rfl:     multivitamin with minerals tablet tablet, Take 1 tablet by mouth Daily., Disp: , Rfl:     Omega-3 Fatty Acids (fish oil) 1000 MG capsule capsule, Take  by mouth Daily With Breakfast., Disp: , Rfl:     terconazole (TERAZOL 3) 0.8 %  "vaginal cream, , Disp: , Rfl:     traMADol (ULTRAM) 50 MG tablet, Take 1 tablet by mouth Every 12 (Twelve) Hours As Needed for Moderate Pain., Disp: , Rfl:     Allergies:   Allergies   Allergen Reactions    Atorvastatin Myalgia    Crestor [Rosuvastatin] Myalgia    Livalo [Pitavastatin] Myalgia    Simvastatin Myalgia    Latex Itching    Gentamicin Palpitations     Other reaction(s): VOMITING         STEADI Fall Risk Assessment was completed, and patient is at HIGH risk for falls. Assessment completed on:5/13/2025    Objective     Objective:    /76 (BP Location: Left arm, Patient Position: Sitting, Cuff Size: Adult)   Pulse 67   Resp 18   Ht 160 cm (62.99\")   Wt 77.1 kg (170 lb)   SpO2 97%   BMI 30.12 kg/m²   Body mass index is 30.12 kg/m².    Physical Exam  Vitals reviewed.   Constitutional:       Appearance: Normal appearance.   HENT:      Head: Normocephalic and atraumatic.      Mouth/Throat:      Mouth: Mucous membranes are moist.      Pharynx: Oropharynx is clear.   Pulmonary:      Effort: Pulmonary effort is normal. No respiratory distress.   Musculoskeletal:      Right lower leg: No edema.      Left lower leg: No edema.   Neurological:      Mental Status: She is alert.          Neurology Exam:    General apperance: NAD.     Mental status: Alert, awake and oriented to time place and person.    Fund of knowledge:  Normal.     Language and Speech: No aphasia or dysarthria.    Naming , Repetition and Comprehension:  Can name objects, repeat a sentence and follow commands. Speech is clear and fluent with good repetition, comprehension, and naming.    Cranial Nerves:   CN II: Visual fields are full. Intact. Pupils - PERRLA  CN III, IV and VI: Extraocular movements are intact. Normal saccades.   CN V: Facial sensation is intact.   CN VII: Muscles of facial expression reveal no asymmetry. Intact.   CN VIII: Hearing is intact.   CN IX and X: Palate elevates symmetrically. Intact  CN XI: Shoulder shrug is " intact.   CN XII: Tongue is midline without evidence of atrophy or fasciculation.     Motor:  Right UE muscle strength 5/5. Normal tone.     Left UE muscle strength 5/5. Normal tone.      Right LE muscle strength 5/5. Normal tone.     Left LE muscle strength 5/5. Normal tone.      No resting tremor of BUE  No action tremor of BUE  No cogwheeling or rigidity    Sensory: Normal light touch sensation bilaterally.    DTRs: 2+ bilaterally in upper and lower extremities.    Coordination: Normal finger-to-nose    Gait: Normal. No assistive device, no tremor while walking    MOCA 24/30 recall 2/5, then MOCA 27/30, recall 5/5 when I was in the room with her      Results:   Imaging:   No Images in the past 120 days found..     Labs:   TSH          9/13/2024    15:20 5/7/2025    09:08   TSH   TSH 0.698  1.720          Assessment / Plan      Assessment/Plan:   Diagnoses and all orders for this visit:    1. Memory loss (Primary)    2. Personal history of fibromyalgia  -     Ambulatory Referral to Rheumatology        Shelly returns for routine follow up. Memory overall stable to improved, MOCA today 27/30 with 5/5 for delayed recall.  We discussed that given her current testing and overall perception of her memory that it is reasonable to continue to monitor memory for now, should memory decline we could consider initiating low-dose memantine. We also discussed consideration for phosphorylated tau 217 testing at f/u appointment.  She did report concern today as well regarding generalized pain and that she was previously diagnosed with fibromyalgia, she would like to establish care with rheumatology, rheumatology referral has been placed for further treatment and evaluation of fibromyalgia.  I have again encouraged her to follow-up with neurosurgery for her lumbar spine concerns.  Will plan to see back in neurology clinic in 6 months or sooner for any questions or concerns    Patient Education:       Reviewed medications, potential  side effects and signs and symptoms to report. Discussed risk versus benefits of treatment plan with patient and/or family-including medications, labs and radiology that may be ordered. Addressed questions and concerns during visit. Patient and/or family verbalized understanding and agree with plan. Instructed to call the office with any questions and report to ER with any life-threatening symptoms.     Follow Up:   Return in about 6 months (around 11/13/2025).    I spent  35  minutes in the care of this patient. I personally spent 50 percent of this time counseling and discussing diagnostic testing and evaluation .       During this visit the following were done:  Labs Reviewed []    Labs Ordered []    Radiology Reports Reviewed []    Radiology Ordered []    PCP Records Reviewed []    Referring Provider Records Reviewed []    ER Records Reviewed []    Hospital Records Reviewed []    History Obtained From Family []    Radiology Images Reviewed []    Other Reviewed []    Records Requested []      TIARA Stanley  [facility] NEUROLOGY  [address]  [place] [place] [facility] [address] [phone]

## 2025-05-21 ENCOUNTER — OFFICE VISIT (OUTPATIENT)
Dept: CARDIOLOGY | Facility: CLINIC | Age: 75
End: 2025-05-21
Payer: MEDICARE

## 2025-05-21 VITALS
SYSTOLIC BLOOD PRESSURE: 132 MMHG | WEIGHT: 176 LBS | HEART RATE: 62 BPM | DIASTOLIC BLOOD PRESSURE: 76 MMHG | BODY MASS INDEX: 31.18 KG/M2 | HEIGHT: 63 IN

## 2025-05-21 DIAGNOSIS — E03.9 HYPOTHYROIDISM, UNSPECIFIED TYPE: ICD-10-CM

## 2025-05-21 DIAGNOSIS — I47.19 ATRIAL TACHYCARDIA: ICD-10-CM

## 2025-05-21 DIAGNOSIS — E78.00 HYPERCHOLESTEROLEMIA: Primary | ICD-10-CM

## 2025-05-21 DIAGNOSIS — I10 PRIMARY HYPERTENSION: ICD-10-CM

## 2025-05-21 PROCEDURE — 1160F RVW MEDS BY RX/DR IN RCRD: CPT | Performed by: NURSE PRACTITIONER

## 2025-05-21 PROCEDURE — 99214 OFFICE O/P EST MOD 30 MIN: CPT | Performed by: NURSE PRACTITIONER

## 2025-05-21 PROCEDURE — 3075F SYST BP GE 130 - 139MM HG: CPT | Performed by: NURSE PRACTITIONER

## 2025-05-21 PROCEDURE — 3078F DIAST BP <80 MM HG: CPT | Performed by: NURSE PRACTITIONER

## 2025-05-21 PROCEDURE — 1159F MED LIST DOCD IN RCRD: CPT | Performed by: NURSE PRACTITIONER

## 2025-05-21 RX ORDER — LEVOTHYROXINE SODIUM 88 UG/1
88 TABLET ORAL
COMMUNITY

## 2025-05-21 NOTE — PROGRESS NOTES
Chief Complaint   Patient presents with    Follow-up     Cardiac management    Lab     Last labs in chart from [facility] on 5/7/25.    Blood pressure     Had been elevated, reports seeing PCP and had to have levothyroxine adjusted. No further problems.    Palpitations     Notices when she does not sleep well.    Med Refill     PCP writes refills.    Chest Pain     Does notice tightness in mid chest at times, feels could be related to asthma.    Fatigue     Has noticed increased fatigue.     Subjective     HPI    Shelly Stevens is a 74 y.o. female with HTN, hypothyroidism, hypercholesterolemia and SVT diagnosed in 2021 by EP after having near syncopal episode. Echo showed normal LVEF.  Also felt she had vasovagal episodes and beta-blocker not added.  HCTZ and ARB started, later reported side effects. She takes medications as needed due to labile nature of blood pressure.  She is statin intolerant.  .  She returned May 2024 for follow-up with multiple complaints.  Stress test, echocardiogram, and Holter monitor repeated.  No ischemia, normal LVEF, mild MR and AI noted.  143 runs of SVT.  We tried flecainide but she did not tolerate.  She was referred to EP for opinion.     She underwent cardiopulmonary stress test at , reported she had atrial fibrillation throughout the testing.  Strips unavailable. Dr. Villalobos also unable to get strips. Plan to start Noac only if AF is documented. Recommended conservative management of SVT.     She presents today for follow up. No cardiac symptoms. BP mostly well controlled after levothyroxine dose adjusted.  Olmesartan 20 mg using PRN, sometimes takes 1/2 tab. Also taking HCTZ 12.5 mg PRN. Palpitation are infrequent now. She declines repeat holter or loop recorder. No TIA. Labs 5/7/25:  (after stopping Leqvio), A1C 5.8%.     Cardiac History:    Past Surgical History:   Procedure Laterality Date    CARDIOVASCULAR STRESS TEST  06/24/2022    4 Min. 7.00 METS. 91% THR.  215/74. EF 56%. Breast attenuation    CARDIOVASCULAR STRESS TEST  05/10/2024    Lexiscan- EF > 70%. Negative    CONVERTED (HISTORICAL) HOLTER  02/23/2021    -11 Days. Avg 64. . 162 SVT    CONVERTED (HISTORICAL) HOLTER  06/10/2024    < 10 Days. Avg 67. . 143 SVT    CONVERTED (HISTORICAL) HOLTER  08/09/2024    7 Days. AVG 67. . 65 SVT    ECHO - CONVERTED  06/15/2022    EF 65%. Trace-Mild MR & AI. RVSP- 27 mmHg    ECHO - CONVERTED  05/10/2024    EF 70%. Mild MR & AI. RVSP- 31 mmHg    GALLBLADDER SURGERY      HYSTERECTOMY      OTHER SURGICAL HISTORY  11/30/2023    Pulse O2- Borderline    SPHENOIDECTOMY      TONSILLECTOMY         Current Outpatient Medications   Medication Sig Dispense Refill    albuterol (ProAir RespiClick) 108 (90 Base) MCG/ACT inhaler 1 puff Every 6 (Six) Hours As Needed.      Asmanex, 30 Metered Doses, 220 MCG/ACT inhaler 1 puff As Needed.      cycloSPORINE (RESTASIS) 0.05 % ophthalmic emulsion Administer 1 drop to both eyes Daily.      dexlansoprazole (DEXILANT) 60 MG capsule Daily As Needed.      levothyroxine (SYNTHROID, LEVOTHROID) 88 MCG tablet Take 1 tablet by mouth Every Morning.      montelukast (SINGULAIR) 10 MG tablet Take 1 tablet by mouth Every Night.      multivitamin with minerals tablet tablet Take 1 tablet by mouth Daily.      Omega-3 Fatty Acids (fish oil) 1000 MG capsule capsule Take  by mouth Daily With Breakfast.      terconazole (TERAZOL 3) 0.8 % vaginal cream As Needed.      traMADol (ULTRAM) 50 MG tablet Take 1 tablet by mouth Every 12 (Twelve) Hours As Needed for Moderate Pain.       No current facility-administered medications for this visit.     Atorvastatin, Crestor [rosuvastatin], Livalo [pitavastatin], Simvastatin, Gentamicin, and Latex    Past Medical History:   Diagnosis Date    Anemia     Anxiety     Asthma     Back problem     Bronchitis     Claustrophobia     COVID-19 07/2021    Depression     Headache     HL (hearing loss)      "Mononucleosis 2023    Pre-diabetes     Thyroid disease     Tremors of nervous system      Social History     Socioeconomic History    Marital status:    Tobacco Use    Smoking status: Former     Current packs/day: 0.00     Types: Cigarettes     Quit date: 1970     Years since quittin.4     Passive exposure: Never    Smokeless tobacco: Never   Vaping Use    Vaping status: Never Used    Passive vaping exposure: Yes   Substance and Sexual Activity    Alcohol use: Never    Drug use: Never    Sexual activity: Defer     Family History   Problem Relation Age of Onset    COPD Mother     Asthma Mother     Arthritis Mother     Hyperlipidemia Mother     Heart attack Maternal Grandfather     No Known Problems Son     No Known Problems Son     Seizures Daughter      Review of Systems   Constitutional: Negative for decreased appetite and malaise/fatigue.   HENT: Negative.     Eyes:  Negative for blurred vision.   Cardiovascular:  Negative for chest pain, dyspnea on exertion, leg swelling, palpitations and syncope.   Respiratory:  Negative for shortness of breath and sleep disturbances due to breathing.    Endocrine: Negative.    Hematologic/Lymphatic: Negative for bleeding problem. Does not bruise/bleed easily.   Skin: Negative.    Musculoskeletal:  Negative for falls and myalgias.   Gastrointestinal:  Negative for abdominal pain, heartburn and melena.   Genitourinary:  Negative for hematuria.   Neurological:  Negative for dizziness and light-headedness.   Psychiatric/Behavioral:  Negative for altered mental status.    Allergic/Immunologic: Negative.       Objective     /76 (BP Location: Right arm, Patient Position: Sitting)   Pulse 62   Ht 160 cm (62.99\")   Wt 79.8 kg (176 lb)   BMI 31.18 kg/m²     Vitals and nursing note reviewed.   Constitutional:       General: Not in acute distress.     Appearance: Well-developed. Not diaphoretic.   Eyes:      Pupils: Pupils are equal, round, and reactive to " light.   HENT:      Head: Normocephalic.   Pulmonary:      Effort: Pulmonary effort is normal. No respiratory distress.      Breath sounds: Normal breath sounds.   Cardiovascular:      Normal rate. Regular rhythm.   Pulses:     Intact distal pulses.   Edema:     Peripheral edema absent.   Abdominal:      General: Bowel sounds are normal.      Palpations: Abdomen is soft.   Musculoskeletal: Normal range of motion.      Cervical back: Normal range of motion. Skin:     General: Skin is warm and dry.   Neurological:      Mental Status: Alert and oriented to person, place, and time.        Procedures          Problem List Items Addressed This Visit          Cardiac and Vasculature    Hypercholesterolemia - Primary    Primary hypertension    Atrial tachycardia    Overview   Holter monitor Edda 10, 2024 multiple episodes of tacky palpitations with atrial tachycardia correlating with symptoms  Repeat monitor August 9, 2024 reduction in tachypalpitations, fewer atrial tachycardia episodes            Endocrine and Metabolic    Hypothyroidism    Relevant Medications    levothyroxine (SYNTHROID, LEVOTHROID) 88 MCG tablet      Palpitations  -SVT, brief episodes, well controlled  -evaluated by EP, recommended conservative management  -labs stable     HTN  -stable at 132/76  -she prefers to continue meds PRN, olmesartan and hctz    Hyperlipidemia  -statin intolerant  -PCSK9 intolerant  -Mediterranean diet     BMI is >= 30 and <35. (Class 1 Obesity). The following options were offered after discussion;: nutrition counseling/recommendations               Electronically signed by TIARA Pastrana,  May 23, 2025 11:38 EDT